# Patient Record
Sex: FEMALE | Race: WHITE | NOT HISPANIC OR LATINO | Employment: OTHER | URBAN - METROPOLITAN AREA
[De-identification: names, ages, dates, MRNs, and addresses within clinical notes are randomized per-mention and may not be internally consistent; named-entity substitution may affect disease eponyms.]

---

## 2022-07-14 ENCOUNTER — OFFICE VISIT (OUTPATIENT)
Dept: FAMILY MEDICINE CLINIC | Facility: CLINIC | Age: 77
End: 2022-07-14

## 2022-07-14 ENCOUNTER — LAB (OUTPATIENT)
Dept: LAB | Facility: HOSPITAL | Age: 77
End: 2022-07-14

## 2022-07-14 VITALS
HEIGHT: 61 IN | OXYGEN SATURATION: 97 % | RESPIRATION RATE: 18 BRPM | WEIGHT: 152 LBS | DIASTOLIC BLOOD PRESSURE: 70 MMHG | TEMPERATURE: 98.6 F | SYSTOLIC BLOOD PRESSURE: 114 MMHG | HEART RATE: 53 BPM | BODY MASS INDEX: 28.7 KG/M2

## 2022-07-14 DIAGNOSIS — D64.9 ANEMIA, UNSPECIFIED TYPE: ICD-10-CM

## 2022-07-14 DIAGNOSIS — I10 HYPERTENSION, UNSPECIFIED TYPE: ICD-10-CM

## 2022-07-14 DIAGNOSIS — Z12.31 ENCOUNTER FOR SCREENING MAMMOGRAM FOR MALIGNANT NEOPLASM OF BREAST: Primary | ICD-10-CM

## 2022-07-14 DIAGNOSIS — Z80.3 FAMILY HISTORY OF BREAST CANCER: ICD-10-CM

## 2022-07-14 DIAGNOSIS — R52 PAIN: ICD-10-CM

## 2022-07-14 DIAGNOSIS — R79.9 ABNORMAL FINDING OF BLOOD CHEMISTRY, UNSPECIFIED: ICD-10-CM

## 2022-07-14 LAB
25(OH)D3 SERPL-MCNC: 21.4 NG/ML (ref 30–100)
ALBUMIN SERPL-MCNC: 4.2 G/DL (ref 3.5–5.2)
ALBUMIN/GLOB SERPL: 1.9 G/DL
ALP SERPL-CCNC: 60 U/L (ref 39–117)
ALT SERPL W P-5'-P-CCNC: 10 U/L (ref 1–33)
ANION GAP SERPL CALCULATED.3IONS-SCNC: 11 MMOL/L (ref 5–15)
AST SERPL-CCNC: 18 U/L (ref 1–32)
BILIRUB SERPL-MCNC: 0.4 MG/DL (ref 0–1.2)
BUN SERPL-MCNC: 10 MG/DL (ref 8–23)
BUN/CREAT SERPL: 11.5 (ref 7–25)
CALCIUM SPEC-SCNC: 8.7 MG/DL (ref 8.6–10.5)
CHLORIDE SERPL-SCNC: 101 MMOL/L (ref 98–107)
CHOLEST SERPL-MCNC: 183 MG/DL (ref 0–200)
CHROMATIN AB SERPL-ACNC: <10 IU/ML (ref 0–14)
CO2 SERPL-SCNC: 26 MMOL/L (ref 22–29)
CREAT SERPL-MCNC: 0.87 MG/DL (ref 0.57–1)
CRP SERPL-MCNC: <0.3 MG/DL (ref 0–0.5)
DEPRECATED RDW RBC AUTO: 44.7 FL (ref 37–54)
EGFRCR SERPLBLD CKD-EPI 2021: 68.3 ML/MIN/1.73
ERYTHROCYTE [DISTWIDTH] IN BLOOD BY AUTOMATED COUNT: 15.1 % (ref 12.3–15.4)
ERYTHROCYTE [SEDIMENTATION RATE] IN BLOOD: 4 MM/HR (ref 0–30)
GLOBULIN UR ELPH-MCNC: 2.2 GM/DL
GLUCOSE SERPL-MCNC: 83 MG/DL (ref 65–99)
HBA1C MFR BLD: 5.5 % (ref 4.8–5.6)
HCT VFR BLD AUTO: 34.5 % (ref 34–46.6)
HDLC SERPL-MCNC: 84 MG/DL (ref 40–60)
HGB BLD-MCNC: 11.6 G/DL (ref 12–15.9)
LDLC SERPL CALC-MCNC: 86 MG/DL (ref 0–100)
LDLC/HDLC SERPL: 1.01 {RATIO}
MCH RBC QN AUTO: 28.2 PG (ref 26.6–33)
MCHC RBC AUTO-ENTMCNC: 33.6 G/DL (ref 31.5–35.7)
MCV RBC AUTO: 83.7 FL (ref 79–97)
PLATELET # BLD AUTO: 277 10*3/MM3 (ref 140–450)
PMV BLD AUTO: 9 FL (ref 6–12)
POTASSIUM SERPL-SCNC: 4.5 MMOL/L (ref 3.5–5.2)
PROT SERPL-MCNC: 6.4 G/DL (ref 6–8.5)
RBC # BLD AUTO: 4.12 10*6/MM3 (ref 3.77–5.28)
SODIUM SERPL-SCNC: 138 MMOL/L (ref 136–145)
TRIGL SERPL-MCNC: 69 MG/DL (ref 0–150)
TSH SERPL DL<=0.05 MIU/L-ACNC: 1.23 UIU/ML (ref 0.27–4.2)
VIT B12 BLD-MCNC: 170 PG/ML (ref 211–946)
VLDLC SERPL-MCNC: 13 MG/DL (ref 5–40)
WBC NRBC COR # BLD: 5.12 10*3/MM3 (ref 3.4–10.8)

## 2022-07-14 PROCEDURE — 80061 LIPID PANEL: CPT | Performed by: STUDENT IN AN ORGANIZED HEALTH CARE EDUCATION/TRAINING PROGRAM

## 2022-07-14 PROCEDURE — 85652 RBC SED RATE AUTOMATED: CPT | Performed by: STUDENT IN AN ORGANIZED HEALTH CARE EDUCATION/TRAINING PROGRAM

## 2022-07-14 PROCEDURE — 83036 HEMOGLOBIN GLYCOSYLATED A1C: CPT | Performed by: STUDENT IN AN ORGANIZED HEALTH CARE EDUCATION/TRAINING PROGRAM

## 2022-07-14 PROCEDURE — 36415 COLL VENOUS BLD VENIPUNCTURE: CPT | Performed by: STUDENT IN AN ORGANIZED HEALTH CARE EDUCATION/TRAINING PROGRAM

## 2022-07-14 PROCEDURE — 86140 C-REACTIVE PROTEIN: CPT | Performed by: STUDENT IN AN ORGANIZED HEALTH CARE EDUCATION/TRAINING PROGRAM

## 2022-07-14 PROCEDURE — 82728 ASSAY OF FERRITIN: CPT | Performed by: STUDENT IN AN ORGANIZED HEALTH CARE EDUCATION/TRAINING PROGRAM

## 2022-07-14 PROCEDURE — 82306 VITAMIN D 25 HYDROXY: CPT | Performed by: STUDENT IN AN ORGANIZED HEALTH CARE EDUCATION/TRAINING PROGRAM

## 2022-07-14 PROCEDURE — 82607 VITAMIN B-12: CPT | Performed by: STUDENT IN AN ORGANIZED HEALTH CARE EDUCATION/TRAINING PROGRAM

## 2022-07-14 PROCEDURE — 86431 RHEUMATOID FACTOR QUANT: CPT | Performed by: STUDENT IN AN ORGANIZED HEALTH CARE EDUCATION/TRAINING PROGRAM

## 2022-07-14 PROCEDURE — 86038 ANTINUCLEAR ANTIBODIES: CPT | Performed by: STUDENT IN AN ORGANIZED HEALTH CARE EDUCATION/TRAINING PROGRAM

## 2022-07-14 PROCEDURE — 85027 COMPLETE CBC AUTOMATED: CPT | Performed by: STUDENT IN AN ORGANIZED HEALTH CARE EDUCATION/TRAINING PROGRAM

## 2022-07-14 PROCEDURE — 84466 ASSAY OF TRANSFERRIN: CPT | Performed by: STUDENT IN AN ORGANIZED HEALTH CARE EDUCATION/TRAINING PROGRAM

## 2022-07-14 PROCEDURE — 86225 DNA ANTIBODY NATIVE: CPT | Performed by: STUDENT IN AN ORGANIZED HEALTH CARE EDUCATION/TRAINING PROGRAM

## 2022-07-14 PROCEDURE — 83540 ASSAY OF IRON: CPT | Performed by: STUDENT IN AN ORGANIZED HEALTH CARE EDUCATION/TRAINING PROGRAM

## 2022-07-14 PROCEDURE — 84443 ASSAY THYROID STIM HORMONE: CPT | Performed by: STUDENT IN AN ORGANIZED HEALTH CARE EDUCATION/TRAINING PROGRAM

## 2022-07-14 PROCEDURE — 99204 OFFICE O/P NEW MOD 45 MIN: CPT | Performed by: STUDENT IN AN ORGANIZED HEALTH CARE EDUCATION/TRAINING PROGRAM

## 2022-07-14 PROCEDURE — 80053 COMPREHEN METABOLIC PANEL: CPT | Performed by: STUDENT IN AN ORGANIZED HEALTH CARE EDUCATION/TRAINING PROGRAM

## 2022-07-14 PROCEDURE — 82746 ASSAY OF FOLIC ACID SERUM: CPT | Performed by: STUDENT IN AN ORGANIZED HEALTH CARE EDUCATION/TRAINING PROGRAM

## 2022-07-14 RX ORDER — METOPROLOL TARTRATE 50 MG/1
50 TABLET, FILM COATED ORAL 2 TIMES DAILY
COMMUNITY
Start: 2022-07-01 | End: 2022-12-06 | Stop reason: SDUPTHER

## 2022-07-14 RX ORDER — RIVAROXABAN 20 MG/1
20 TABLET, FILM COATED ORAL DAILY
COMMUNITY
Start: 2022-05-27 | End: 2022-10-28 | Stop reason: SDUPTHER

## 2022-07-14 RX ORDER — LORATADINE 10 MG/1
CAPSULE, LIQUID FILLED ORAL
COMMUNITY
End: 2022-12-06 | Stop reason: SDUPTHER

## 2022-07-14 RX ORDER — TRAZODONE HYDROCHLORIDE 100 MG/1
TABLET ORAL
COMMUNITY
Start: 2022-05-27 | End: 2022-07-14

## 2022-07-14 RX ORDER — GABAPENTIN 300 MG/1
300 CAPSULE ORAL 2 TIMES DAILY
COMMUNITY
Start: 2022-05-27 | End: 2022-08-17

## 2022-07-14 RX ORDER — LOSARTAN POTASSIUM 50 MG/1
TABLET ORAL
COMMUNITY
Start: 2022-05-03 | End: 2022-12-06 | Stop reason: SDUPTHER

## 2022-07-14 RX ORDER — AMIODARONE HYDROCHLORIDE 200 MG/1
200 TABLET ORAL DAILY
COMMUNITY
Start: 2022-05-27 | End: 2022-12-06 | Stop reason: SDUPTHER

## 2022-07-14 NOTE — PROGRESS NOTES
New Patient Office Visit      Patient Name: Catrachita Brunson  : 1945   MRN: [id number]     Chief Complaint:  Atrial Fibrillation (Est care)     History of Present Illness:     A fib  Diagnosed about 3 months ago in [place]. She was cardioverted. She takes anticoagulation with xarelto 20 mg daily. She sees cardiology in [place] but I do not have records. Requested. She is taking her metoprolol and amiodarone.  She is currently stable.     She has never been diagnosed with ra but has mcp swelling with chronic pain. She has morning stiffness.     htn  Taking losartan    Brother was diagnosed with stomach cancer. Mother had breast cancer.     Gabapentin  She says she takes this for chronic arthritis pain. She takes this very rarely . One pill every two weeks she says. She has >20 pills left. She says she actually doesn't want to be on this med if she doesn't have to be. Discussed I do not recommend to take it given her alcohol use it can cause serious issues.     She does have a history of alcohol use. She says she drinks 3 beers per day. She has been drinking her entire life she says. She has history of injury with fall while drunk she says and in the past she has been out all night drunk and not known what she had done.       Subjective        History reviewed. No pertinent past medical history.    Past Surgical History:   Procedure Laterality Date   • CARPAL TUNNEL RELEASE     • JOINT REPLACEMENT      both knees, both shoulders   • SPINE SURGERY      cervicla disk replacement       Family History   Problem Relation Age of Onset   • Cancer Mother    • Cancer Father    • Cancer Sister        Social History     Socioeconomic History   • Marital status: Single   Tobacco Use   • Smoking status: Never Smoker   • Smokeless tobacco: Never Used   Substance and Sexual Activity   • Alcohol use: Yes     Alcohol/week: 7.0 standard drinks     Types: 7 Cans of beer per week   • Drug use: Never   • Sexual activity:  "Defer          Current Outpatient Medications:   •  amiodarone (PACERONE) 200 MG tablet, Take 200 mg by mouth Daily., Disp: , Rfl:   •  Diclofenac Sodium (VOLTAREN) 1 % gel gel, APPLY 4 GRAMS TOPICALLY TO RIGHT KNEE 4 TIMES DAILY AS NEEDED FOR PAIN, Disp: , Rfl:   •  gabapentin (NEURONTIN) 300 MG capsule, Take 300 mg by mouth 2 (Two) Times a Day., Disp: , Rfl:   •  Loratadine 10 MG capsule, Take  by mouth., Disp: , Rfl:   •  losartan (COZAAR) 50 MG tablet, TAKE 1 TABLET BY MOUTH TWICE DAILY HOLD FOR SBP <110, Disp: , Rfl:   •  metoprolol tartrate (LOPRESSOR) 50 MG tablet, Take 50 mg by mouth 2 (Two) Times a Day., Disp: , Rfl:   •  Xarelto 20 MG tablet, Take 20 mg by mouth Daily., Disp: , Rfl:     Allergies   Allergen Reactions   • Penicillins Anaphylaxis       Objective     Physical Exam:  Vitals:    07/14/22 1350   BP: 114/70   Pulse: 53   Resp: 18   Temp: 98.6 °F (37 °C)   SpO2: 97%   Weight: 68.9 kg (152 lb)   Height: 154.9 cm (61\")      Body mass index is 28.72 kg/m².     Physical Exam  Constitutional:       General: She is not in acute distress.     Appearance: Normal appearance.   HENT:      Head: Normocephalic and atraumatic.   Eyes:      Extraocular Movements: Extraocular movements intact.   Cardiovascular:      Rate and Rhythm: Normal rate and regular rhythm.      Heart sounds: No murmur heard.  Pulmonary:      Effort: Pulmonary effort is normal. No respiratory distress.      Breath sounds: Normal breath sounds.   Abdominal:      General: Abdomen is flat.   Musculoskeletal:         General: No swelling.      Cervical back: Normal range of motion.   Skin:     Findings: No rash.   Neurological:      General: No focal deficit present.      Mental Status: She is alert.   Psychiatric:         Mood and Affect: Mood normal.              Assessment / Plan      Assessment/Plan:   Diagnoses and all orders for this visit:    1. Encounter for screening mammogram for malignant neoplasm of breast (Primary)  -     Mammo " screening digital tomosynthesis bilateral w CAD; Future    2. Family history of breast cancer  -     Ambulatory Referral to Genetics    3. Pain  -     Cancel: Ambulatory Referral to Pain Management  -     XR Hand 3+ View Bilateral (In Office)    4. Hypertension, unspecified type  -     CBC (No Diff); Future  -     Comprehensive Metabolic Panel; Future  -     Hemoglobin A1c; Future  -     TSH Rfx On Abnormal To Free T4; Future  -     Vitamin B12; Future  -     Vitamin D 25 Hydroxy; Future  -     Lipid Panel; Future  -     ANNABELLA; Future  -     Rheumatoid Factor; Future  -     Sedimentation rate, automated; Future  -     C-reactive protein; Future  -     Anti-DNA Antibody, Double-stranded; Future    5. Abnormal finding of blood chemistry, unspecified   -     Hemoglobin A1c; Future    6. Body mass index (BMI) 30.0-30.9, adult   -     Vitamin D 25 Hydroxy; Future    For arthritis,  obtain xray of hands as well as inflammatory work up.    Request cardio records.     I have counseled her on alcohol cessation. She declines referral to addiction medicine/aa resources or counseling.     Return in about 1 month (around 8/14/2022) for Medicare Wellness.       Maricel Cartagena D.O.  [facility]

## 2022-07-15 DIAGNOSIS — D64.9 ANEMIA, UNSPECIFIED TYPE: Primary | ICD-10-CM

## 2022-07-15 LAB
FERRITIN SERPL-MCNC: 37.3 NG/ML (ref 13–150)
FOLATE SERPL-MCNC: 14.5 NG/ML (ref 4.78–24.2)
IRON 24H UR-MRATE: 30 MCG/DL (ref 37–145)
IRON SATN MFR SERPL: 6 % (ref 20–50)
TIBC SERPL-MCNC: 483 MCG/DL (ref 298–536)
TRANSFERRIN SERPL-MCNC: 324 MG/DL (ref 200–360)

## 2022-07-18 LAB
ANA SER QL: NEGATIVE
DSDNA AB SER-ACNC: <1 IU/ML (ref 0–9)

## 2022-07-21 ENCOUNTER — TELEPHONE (OUTPATIENT)
Dept: GASTROENTEROLOGY | Facility: CLINIC | Age: 77
End: 2022-07-21

## 2022-08-01 ENCOUNTER — OFFICE VISIT (OUTPATIENT)
Dept: FAMILY MEDICINE CLINIC | Facility: CLINIC | Age: 77
End: 2022-08-01

## 2022-08-01 VITALS
HEART RATE: 54 BPM | BODY MASS INDEX: 27.94 KG/M2 | RESPIRATION RATE: 18 BRPM | TEMPERATURE: 98.2 F | SYSTOLIC BLOOD PRESSURE: 116 MMHG | DIASTOLIC BLOOD PRESSURE: 72 MMHG | HEIGHT: 61 IN | OXYGEN SATURATION: 97 % | WEIGHT: 148 LBS

## 2022-08-01 DIAGNOSIS — Z01.810 PREOP CARDIOVASCULAR EXAM: ICD-10-CM

## 2022-08-01 DIAGNOSIS — Z80.0 FAMILY HISTORY- STOMACH CANCER: ICD-10-CM

## 2022-08-01 DIAGNOSIS — D64.9 ANEMIA, UNSPECIFIED TYPE: Primary | ICD-10-CM

## 2022-08-01 PROCEDURE — 96372 THER/PROPH/DIAG INJ SC/IM: CPT | Performed by: STUDENT IN AN ORGANIZED HEALTH CARE EDUCATION/TRAINING PROGRAM

## 2022-08-01 PROCEDURE — 99214 OFFICE O/P EST MOD 30 MIN: CPT | Performed by: STUDENT IN AN ORGANIZED HEALTH CARE EDUCATION/TRAINING PROGRAM

## 2022-08-01 RX ORDER — CYANOCOBALAMIN 1000 UG/ML
1000 INJECTION, SOLUTION INTRAMUSCULAR; SUBCUTANEOUS
Status: SHIPPED | OUTPATIENT
Start: 2022-08-01

## 2022-08-01 RX ADMIN — CYANOCOBALAMIN 1000 MCG: 1000 INJECTION, SOLUTION INTRAMUSCULAR; SUBCUTANEOUS at 11:15

## 2022-08-01 NOTE — PROGRESS NOTES
"Chief Complaint  Pre-op Exam (Scheduled 9/22 with gastro)    History of Present Illness       She says she has never had a heart attack or stroke. She denies a history of heart failure. No history of prosthetic valve. No history of blood clot.  She has no chest pain shortness of breath or palpitations.   She says she can climb 2 flights of stairs without getting sob.    The following portions of the patient's history were reviewed and updated as appropriate: allergies, current medications, past family history, past medical history, past social history, past surgical history, and problem list.    OBJECTIVE:  /72   Pulse 54   Temp 98.2 °F (36.8 °C)   Resp 18   Ht 154.9 cm (61\")   Wt 67.1 kg (148 lb)   SpO2 97%   BMI 27.96 kg/m²       Physical Exam  Constitutional:       General: She is not in acute distress.     Appearance: Normal appearance.   HENT:      Head: Normocephalic and atraumatic.   Eyes:      Extraocular Movements: Extraocular movements intact.   Cardiovascular:      Rate and Rhythm: Normal rate and regular rhythm.      Heart sounds: No murmur heard.  Pulmonary:      Effort: Pulmonary effort is normal. No respiratory distress.      Breath sounds: Normal breath sounds.   Abdominal:      General: Abdomen is flat.   Musculoskeletal:         General: No swelling.      Cervical back: Normal range of motion.   Skin:     Findings: No rash.   Neurological:      General: No focal deficit present.      Mental Status: She is alert.   Psychiatric:         Mood and Affect: Mood normal.                    Assessment and Plan   Diagnoses and all orders for this visit:    1. Anemia, unspecified type (Primary)  -     Ambulatory referral for Screening EGD    2. Family history- stomach cancer  -     Ambulatory referral for Screening EGD    3. Preop cardiovascular exam          Chadsvasc is 4. She has been cardioverted and is now in sinus rhythm. Given this I have advised the patient to hold her blood thinner two " days before the procedure and resume one day after if no large polyp is removed. Resume two days later if a large polyp is removed (1 cm polyp or greater).   From a cardiovascular standpoint she is low risk for this procedure.     Will again request records from cardiology.    Return for Next scheduled follow up.       Maricel Cartagena D.O.  [facility]

## 2022-08-02 ENCOUNTER — TELEPHONE (OUTPATIENT)
Dept: FAMILY MEDICINE CLINIC | Facility: CLINIC | Age: 77
End: 2022-08-02

## 2022-08-02 DIAGNOSIS — M11.20 CALCIUM PYROPHOSPHATE DEPOSITION DISEASE (CPPD): Primary | ICD-10-CM

## 2022-08-02 NOTE — TELEPHONE ENCOUNTER
Spoke with the patient advised the pt that no other labs are needed for Dr. Cartagena. Pt verbalize understanding has no further questions at this time.

## 2022-08-02 NOTE — TELEPHONE ENCOUNTER
Asha called stating Ms. Brunson was at their hospital for labwork with no orders.  She said she is a patient of Dr. Cartagena's and did not come with any paperwork.  She stated she is there for pre-op bloodwork but they don't know what she would need and needed some orders.  I spoke with Oksana and Dr. Altamirano and neither could find anywhere in the chart saying bloodwork was needed.      I tried to call Asha back twice to let her know Dr. Cartagena was out of the office this afternoon and this could not be figured out today but it only rang numerous times with no answer.    Can someone call the patient and see what she thinks is needed? [phone]

## 2022-08-10 ENCOUNTER — APPOINTMENT (OUTPATIENT)
Dept: MAMMOGRAPHY | Facility: HOSPITAL | Age: 77
End: 2022-08-10

## 2022-08-11 ENCOUNTER — TELEPHONE (OUTPATIENT)
Dept: GASTROENTEROLOGY | Facility: CLINIC | Age: 77
End: 2022-08-11

## 2022-08-11 NOTE — TELEPHONE ENCOUNTER
I CALLED MS BRADLEY BACK. PATIENT STATED SHE HAD ALREADY TALKED TO SOMEONE AND GO IT TAKEN CARE OF.

## 2022-08-31 ENCOUNTER — TELEPHONE (OUTPATIENT)
Dept: FAMILY MEDICINE CLINIC | Facility: CLINIC | Age: 77
End: 2022-08-31

## 2022-09-09 ENCOUNTER — TELEPHONE (OUTPATIENT)
Dept: FAMILY MEDICINE CLINIC | Facility: CLINIC | Age: 77
End: 2022-09-09

## 2022-09-22 NOTE — TELEPHONE ENCOUNTER
Patient called back she see Francisco Ibrahim at [facility]. Faxed over clearance

## 2022-09-22 NOTE — TELEPHONE ENCOUNTER
Spoke with patient she states shes unsure who her cardiologist is. Will call daughter and call back who she sees.

## 2022-09-26 NOTE — TELEPHONE ENCOUNTER
Patients cardiologist wont clear patient due to not keeping her 6 month follow up with him. She has a EGD scheduled tomorrow. Would you like to proceed with this ?

## 2022-09-26 NOTE — TELEPHONE ENCOUNTER
Shes having the EGD done due to anemia. I did request clearance From Dr. Francisco Ibrahim her cardiologist and she has canceled in the past due to no .

## 2022-09-27 ENCOUNTER — OUTSIDE FACILITY SERVICE (OUTPATIENT)
Dept: GASTROENTEROLOGY | Facility: CLINIC | Age: 77
End: 2022-09-27

## 2022-09-27 PROCEDURE — 43239 EGD BIOPSY SINGLE/MULTIPLE: CPT | Performed by: INTERNAL MEDICINE

## 2022-09-27 PROCEDURE — 88305 TISSUE EXAM BY PATHOLOGIST: CPT | Performed by: INTERNAL MEDICINE

## 2022-09-28 ENCOUNTER — LAB REQUISITION (OUTPATIENT)
Dept: LAB | Facility: HOSPITAL | Age: 77
End: 2022-09-28

## 2022-09-28 DIAGNOSIS — D64.9 ANEMIA, UNSPECIFIED: ICD-10-CM

## 2022-09-29 LAB
CYTO UR: NORMAL
LAB AP CASE REPORT: NORMAL
LAB AP CLINICAL INFORMATION: NORMAL
PATH REPORT.FINAL DX SPEC: NORMAL
PATH REPORT.GROSS SPEC: NORMAL

## 2022-09-30 ENCOUNTER — TELEPHONE (OUTPATIENT)
Dept: GASTROENTEROLOGY | Facility: CLINIC | Age: 77
End: 2022-09-30

## 2022-09-30 NOTE — TELEPHONE ENCOUNTER
----- Message from Sy Red MD sent at 9/29/2022  4:12 PM EDT -----  Please let Catrachita know that she does not have H. pylori

## 2022-10-05 ENCOUNTER — TELEPHONE (OUTPATIENT)
Dept: GASTROENTEROLOGY | Facility: CLINIC | Age: 77
End: 2022-10-05

## 2022-10-11 ENCOUNTER — TELEPHONE (OUTPATIENT)
Dept: GASTROENTEROLOGY | Facility: CLINIC | Age: 77
End: 2022-10-11

## 2022-10-11 DIAGNOSIS — Z12.11 ENCOUNTER FOR SCREENING COLONOSCOPY: Primary | ICD-10-CM

## 2022-10-18 ENCOUNTER — OUTSIDE FACILITY SERVICE (OUTPATIENT)
Dept: GASTROENTEROLOGY | Facility: CLINIC | Age: 77
End: 2022-10-18

## 2022-10-18 PROCEDURE — 45385 COLONOSCOPY W/LESION REMOVAL: CPT | Performed by: INTERNAL MEDICINE

## 2022-10-18 PROCEDURE — 88305 TISSUE EXAM BY PATHOLOGIST: CPT | Performed by: INTERNAL MEDICINE

## 2022-10-19 ENCOUNTER — LAB REQUISITION (OUTPATIENT)
Dept: LAB | Facility: HOSPITAL | Age: 77
End: 2022-10-19

## 2022-10-19 DIAGNOSIS — D64.9 ANEMIA, UNSPECIFIED: ICD-10-CM

## 2022-10-20 ENCOUNTER — TELEPHONE (OUTPATIENT)
Dept: FAMILY MEDICINE CLINIC | Facility: CLINIC | Age: 77
End: 2022-10-20

## 2022-10-21 ENCOUNTER — TELEPHONE (OUTPATIENT)
Dept: GASTROENTEROLOGY | Facility: CLINIC | Age: 77
End: 2022-10-21

## 2022-10-21 NOTE — TELEPHONE ENCOUNTER
----- Message from Sy Red MD sent at 10/20/2022  2:34 PM EDT -----  Please let Catrachita know that she had multiple adenomas.  Follow-up in 2 years time is recommended

## 2022-10-28 ENCOUNTER — OFFICE VISIT (OUTPATIENT)
Dept: FAMILY MEDICINE CLINIC | Facility: CLINIC | Age: 77
End: 2022-10-28

## 2022-10-28 VITALS
DIASTOLIC BLOOD PRESSURE: 82 MMHG | WEIGHT: 142.2 LBS | RESPIRATION RATE: 15 BRPM | TEMPERATURE: 97.8 F | HEIGHT: 61 IN | HEART RATE: 52 BPM | OXYGEN SATURATION: 96 % | SYSTOLIC BLOOD PRESSURE: 117 MMHG | BODY MASS INDEX: 26.85 KG/M2

## 2022-10-28 DIAGNOSIS — Z00.00 WELLNESS EXAMINATION: ICD-10-CM

## 2022-10-28 DIAGNOSIS — M11.20 CALCIUM PYROPHOSPHATE DEPOSITION DISEASE (CPPD): ICD-10-CM

## 2022-10-28 DIAGNOSIS — Z12.31 ENCOUNTER FOR SCREENING MAMMOGRAM FOR MALIGNANT NEOPLASM OF BREAST: Primary | ICD-10-CM

## 2022-10-28 DIAGNOSIS — Z78.0 ENCOUNTER FOR OSTEOPOROSIS SCREENING IN ASYMPTOMATIC POSTMENOPAUSAL PATIENT: ICD-10-CM

## 2022-10-28 DIAGNOSIS — Z13.820 ENCOUNTER FOR OSTEOPOROSIS SCREENING IN ASYMPTOMATIC POSTMENOPAUSAL PATIENT: ICD-10-CM

## 2022-10-28 DIAGNOSIS — I48.19 OTHER PERSISTENT ATRIAL FIBRILLATION: ICD-10-CM

## 2022-10-28 DIAGNOSIS — M79.671 RIGHT FOOT PAIN: ICD-10-CM

## 2022-10-28 PROCEDURE — 1159F MED LIST DOCD IN RCRD: CPT | Performed by: STUDENT IN AN ORGANIZED HEALTH CARE EDUCATION/TRAINING PROGRAM

## 2022-10-28 PROCEDURE — 1160F RVW MEDS BY RX/DR IN RCRD: CPT | Performed by: STUDENT IN AN ORGANIZED HEALTH CARE EDUCATION/TRAINING PROGRAM

## 2022-10-28 PROCEDURE — 1126F AMNT PAIN NOTED NONE PRSNT: CPT | Performed by: STUDENT IN AN ORGANIZED HEALTH CARE EDUCATION/TRAINING PROGRAM

## 2022-10-28 PROCEDURE — 1170F FXNL STATUS ASSESSED: CPT | Performed by: STUDENT IN AN ORGANIZED HEALTH CARE EDUCATION/TRAINING PROGRAM

## 2022-10-28 PROCEDURE — G0439 PPPS, SUBSEQ VISIT: HCPCS | Performed by: STUDENT IN AN ORGANIZED HEALTH CARE EDUCATION/TRAINING PROGRAM

## 2022-10-28 RX ORDER — TRAZODONE HYDROCHLORIDE 100 MG/1
TABLET ORAL
COMMUNITY
Start: 2022-08-23 | End: 2022-12-06 | Stop reason: SDUPTHER

## 2022-10-28 RX ORDER — RIVAROXABAN 20 MG/1
20 TABLET, FILM COATED ORAL DAILY
Qty: 90 TABLET | Refills: 1 | Status: SHIPPED | OUTPATIENT
Start: 2022-10-28 | End: 2022-12-06 | Stop reason: SDUPTHER

## 2022-10-28 NOTE — PROGRESS NOTES
The ABCs of the Annual Wellness Visit  Subsequent Medicare Wellness Visit    Chief Complaint   Patient presents with   • Medicare Wellness-subsequent     Right foot pain      Subjective    History of Present Illness:  Catrachita Brunson is a 77 y.o. female who presents for a Subsequent Medicare Wellness Visit.    The following portions of the patient's history were reviewed and   updated as appropriate: allergies, current medications, past family history, past medical history, past social history, past surgical history and problem list.    Compared to one year ago, the patient feels her physical   health is the same.    Compared to one year ago, the patient feels her mental   health is the same.    Recent Hospitalizations:  She was not admitted to the hospital during the last year.       Current Medical Providers:  Patient Care Team:  Maricel Cartagena DO as PCP - General (Family Medicine)  Francisco Ibrahim MD as Consulting Physician (Interventional Cardiology)    Outpatient Medications Prior to Visit   Medication Sig Dispense Refill   • amiodarone (PACERONE) 200 MG tablet Take 200 mg by mouth Daily.     • Diclofenac Sodium (VOLTAREN) 1 % gel gel APPLY 4 GRAMS TOPICALLY TO RIGHT KNEE 4 TIMES DAILY AS NEEDED FOR PAIN     • Loratadine 10 MG capsule Take  by mouth.     • losartan (COZAAR) 50 MG tablet TAKE 1 TABLET BY MOUTH TWICE DAILY HOLD FOR SBP <110     • metoprolol tartrate (LOPRESSOR) 50 MG tablet Take 50 mg by mouth 2 (Two) Times a Day.     • Sod Picosulfate-Mag Ox-Cit Acd 10-3.5-12 MG-GM -GM/160ML solution Take 160 mL by mouth Take As Directed for 2 doses. 320 mL 0   • traZODone (DESYREL) 100 MG tablet TAKE 1 TABLET BY MOUTH ONCE DAILY AT NIGHT AS NEEDED     • Xarelto 20 MG tablet Take 20 mg by mouth Daily.       Facility-Administered Medications Prior to Visit   Medication Dose Route Frequency Provider Last Rate Last Admin   • cyanocobalamin injection 1,000 mcg  1,000 mcg Intramuscular Q28 Days Maricel Cartagena DO    "1,000 mcg at 08/01/22 1115       No opioid medication identified on active medication list. I have reviewed chart for other potential  high risk medication/s and harmful drug interactions in the elderly.              Patient Active Problem List   Diagnosis   • Preop cardiovascular exam   • Wellness examination     Advance Care Planning  Advance Directive is not on file.  ACP discussion was held with the patient during this visit. Patient does not have an advance directive, information provided.          Objective    Vitals:    10/28/22 1018   BP: 117/82   Pulse: 52   Resp: 15   Temp: 97.8 °F (36.6 °C)   SpO2: 96%   Weight: 64.5 kg (142 lb 3.2 oz)   Height: 154.9 cm (61\")   PainSc: 0-No pain     Estimated body mass index is 26.87 kg/m² as calculated from the following:    Height as of this encounter: 154.9 cm (61\").    Weight as of this encounter: 64.5 kg (142 lb 3.2 oz).      Does the patient have evidence of cognitive impairment? Yes    Physical Exam  Constitutional:       General: She is not in acute distress.     Appearance: Normal appearance.   HENT:      Head: Normocephalic and atraumatic.   Eyes:      Extraocular Movements: Extraocular movements intact.   Cardiovascular:      Rate and Rhythm: Normal rate and regular rhythm.      Heart sounds: No murmur heard.  Pulmonary:      Effort: Pulmonary effort is normal. No respiratory distress.      Breath sounds: Normal breath sounds. No stridor. No wheezing, rhonchi or rales.   Skin:     Findings: No rash.   Neurological:      General: No focal deficit present.      Mental Status: She is alert.   Psychiatric:         Mood and Affect: Mood normal.                 HEALTH RISK ASSESSMENT    Smoking Status:  Social History     Tobacco Use   Smoking Status Never   Smokeless Tobacco Never     Alcohol Consumption:  Social History     Substance and Sexual Activity   Alcohol Use Yes   • Alcohol/week: 7.0 standard drinks   • Types: 7 Cans of beer per week     Fall Risk " Screen:    DAVIDSON Fall Risk Assessment was completed, and patient is at HIGH risk for falls. Assessment completed on:10/28/2022    Depression Screening:  PHQ-2/PHQ-9 Depression Screening 10/28/2022   Little Interest or Pleasure in Doing Things 1-->several days   Feeling Down, Depressed or Hopeless 1-->several days   Trouble Falling or Staying Asleep, or Sleeping Too Much 0-->not at all   Feeling Tired or Having Little Energy 0-->not at all   Poor Appetite or Overeating 0-->not at all   Feeling Bad about Yourself - or that You are a Failure or Have Let Yourself or Your Family Down 0-->not at all   Trouble Concentrating on Things, Such as Reading the Newspaper or Watching Television 0-->not at all   Moving or Speaking So Slowly that Other People Could Have Noticed? Or the Opposite - Being So Fidgety 0-->not at all   Thoughts that You Would be Better Off Dead or of Hurting Yourself in Some Way 0-->not at all   PHQ-9: Brief Depression Severity Measure Score 2   If You Checked Off Any Problems, How Difficult Have These Problems Made It For You to Do Your Work, Take Care of Things at Home, or Get Along with Other People? somewhat difficult       Health Habits and Functional and Cognitive Screening:  Functional & Cognitive Status 10/28/2022   Do you have difficulty preparing food and eating? No   Do you have difficulty bathing yourself, getting dressed or grooming yourself? No   Do you have difficulty using the toilet? No   Do you have difficulty moving around from place to place? No   Do you have trouble with steps or getting out of a bed or a chair? Yes   Current Diet Unhealthy Diet   Dental Exam Not up to date   Eye Exam Up to date   Exercise (times per week) 0 times per week   Do you need help using the phone?  No   Are you deaf or do you have serious difficulty hearing?  No   Do you need help with transportation? No   Do you need help shopping? No   Do you need help preparing meals?  No   Do you need help with  housework?  No   Do you need help with laundry? No   Do you need help taking your medications? No   Do you need help managing money? No   Do you ever drive or ride in a car without wearing a seat belt? No       Age-appropriate Screening Schedule:  Refer to the list below for future screening recommendations based on patient's age, sex and/or medical conditions. Orders for these recommended tests are listed in the plan section. The patient has been provided with a written plan.    Health Maintenance   Topic Date Due   • DXA SCAN  Never done   • TDAP/TD VACCINES (1 - Tdap) Never done   • ZOSTER VACCINE (1 of 2) Never done   • INFLUENZA VACCINE  Never done              Assessment & Plan   CMS Preventative Services Quick Reference  Risk Factors Identified During Encounter  Alcohol Misuse  The above risks/problems have been discussed with the patient.  Follow up actions/plans if indicated are seen below in the Assessment/Plan Section.  Pertinent information has been shared with the patient in the After Visit Summary.    Diagnoses and all orders for this visit:    1. Encounter for screening mammogram for malignant neoplasm of breast (Primary)  -     Mammo screening digital tomosynthesis bilateral w CAD; Future    2. Encounter for osteoporosis screening in asymptomatic postmenopausal patient  -     DEXA Bone Density Axial; Future    3. Wellness examination  -     HIV-1 / O / 2 Ag / Antibody 4th Generation; Future  -     Hepatitis C antibody; Future    4. Right foot pain  -     XR Foot 3+ View Right (In Office)    5. Calcium pyrophosphate deposition disease (CPPD)  -     Cancel: Ambulatory Referral to Rheumatology  -     Ambulatory Referral to Rheumatology    6. Other persistent atrial fibrillation (HCC)  -     Ambulatory Referral to Cardiology    Other orders  -     Xarelto 20 MG tablet; Take 1 tablet by mouth Daily.  Dispense: 90 tablet; Refill: 1        Follow Up:   Return in about 3 weeks (around 11/18/2022), or if  symptoms worsen or fail to improve.     An After Visit Summary and PPPS were made available to the patient.          Annual exam  Colonoscopy up to date   Mammogram says she missed this and needs new order.   Pap smear aged out. She is s/p julisa  dexa scan ordered.   hiv hep c  Screening she is agreeable  a1c /lipid screening up to date   She declines all vaccines today  Counseled on alcohol cessation.     Patient says she has been seeing cardiology at [facility] but I do not have record of this. Will refer to cardiology for a fib.     She complains of pain in her right foot on the bottom of her foot for 3 weeks . No fever or injury but she is concerned she has possibly stepped on glass but has not seen any. On exam there is is a small cut about 1 cm that is in process of healing with no glass or foreign body. Just above this cut there is a firmness under the skin with no skin rash or opening, no foreign body in the skin here is seen. Given there is a firmness of this skin I am concerned she could have glass that has healed over or a thickened callus. I have given her the number to podiatry and scheduled the appointment for her for next Thursday at 10 am. Advised her to contact her transport service today and let them know .given her podiatry address date and time written down. Will check xray while here. Her gait is normal. No fevers. Advised her to go to podiatry today but she is unable today.

## 2022-12-06 ENCOUNTER — TELEPHONE (OUTPATIENT)
Dept: FAMILY MEDICINE CLINIC | Facility: CLINIC | Age: 77
End: 2022-12-06

## 2022-12-06 DIAGNOSIS — Z12.11 ENCOUNTER FOR SCREENING COLONOSCOPY: ICD-10-CM

## 2022-12-06 RX ORDER — METOPROLOL TARTRATE 50 MG/1
50 TABLET, FILM COATED ORAL 2 TIMES DAILY
Qty: 60 TABLET | Refills: 0 | Status: SHIPPED | OUTPATIENT
Start: 2022-12-06 | End: 2023-01-03

## 2022-12-06 RX ORDER — LOSARTAN POTASSIUM 50 MG/1
TABLET ORAL
Qty: 60 TABLET | Refills: 0 | Status: SHIPPED | OUTPATIENT
Start: 2022-12-06 | End: 2023-01-03

## 2022-12-06 RX ORDER — AMIODARONE HYDROCHLORIDE 200 MG/1
200 TABLET ORAL DAILY
Qty: 30 TABLET | Refills: 0 | Status: SHIPPED | OUTPATIENT
Start: 2022-12-06 | End: 2023-01-03

## 2022-12-06 RX ORDER — LORATADINE 10 MG/1
10 CAPSULE, LIQUID FILLED ORAL DAILY
Qty: 30 EACH | Refills: 0 | Status: SHIPPED | OUTPATIENT
Start: 2022-12-06

## 2022-12-06 RX ORDER — TRAZODONE HYDROCHLORIDE 100 MG/1
100 TABLET ORAL NIGHTLY
Qty: 30 TABLET | Refills: 0 | Status: SHIPPED | OUTPATIENT
Start: 2022-12-06 | End: 2023-01-03

## 2022-12-06 RX ORDER — RIVAROXABAN 20 MG/1
20 TABLET, FILM COATED ORAL DAILY
Qty: 90 TABLET | Refills: 0 | Status: SHIPPED | OUTPATIENT
Start: 2022-12-06

## 2022-12-06 NOTE — TELEPHONE ENCOUNTER
Caller: Catrachita Brunson    Relationship: Self    Best call back number: [phone]    Requested Prescriptions:   Requested Prescriptions     Pending Prescriptions Disp Refills   • amiodarone (PACERONE) 200 MG tablet       Sig: Take 1 tablet by mouth Daily.   • Diclofenac Sodium (VOLTAREN) 1 % gel gel     • Loratadine 10 MG capsule 30 each      Sig: Take  by mouth.   • losartan (COZAAR) 50 MG tablet     • metoprolol tartrate (LOPRESSOR) 50 MG tablet       Sig: Take 1 tablet by mouth 2 (Two) Times a Day.   • Sod Picosulfate-Mag Ox-Cit Acd 10-3.5-12 MG-GM -GM/160ML solution 320 mL 0     Sig: Take 160 mL by mouth Take As Directed for 2 doses.   • traZODone (DESYREL) 100 MG tablet     • Xarelto 20 MG tablet 90 tablet 1     Sig: Take 1 tablet by mouth Daily.      Pharmacy where request should be sent: [facility] [address] [place] [id number] [id number] FX     Does the patient have less than a 3 day supply:  [x] Yes  [] No    Would you like a call back once the refill request has been completed: [x] Yes [] No    If the office needs to give you a call back, can they leave a voicemail: [x] Yes [] No    Janel Mercado Rep   12/06/22 11:42 EST

## 2022-12-08 ENCOUNTER — OFFICE VISIT (OUTPATIENT)
Dept: FAMILY MEDICINE CLINIC | Facility: CLINIC | Age: 77
End: 2022-12-08

## 2022-12-08 ENCOUNTER — LAB (OUTPATIENT)
Dept: LAB | Facility: HOSPITAL | Age: 77
End: 2022-12-08

## 2022-12-08 VITALS
BODY MASS INDEX: 26.83 KG/M2 | OXYGEN SATURATION: 96 % | SYSTOLIC BLOOD PRESSURE: 167 MMHG | TEMPERATURE: 98 F | WEIGHT: 142 LBS | HEART RATE: 76 BPM | DIASTOLIC BLOOD PRESSURE: 92 MMHG | RESPIRATION RATE: 19 BRPM
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
## 2022-12-08 DIAGNOSIS — Z00.00 WELLNESS EXAMINATION: ICD-10-CM

## 2022-12-08 DIAGNOSIS — E53.8 B12 DEFICIENCY: ICD-10-CM

## 2022-12-08 DIAGNOSIS — Z11.59 ENCOUNTER FOR SCREENING FOR OTHER VIRAL DISEASES: ICD-10-CM

## 2022-12-08 DIAGNOSIS — D64.9 ANEMIA, UNSPECIFIED TYPE: Primary | ICD-10-CM

## 2022-12-08 DIAGNOSIS — D64.9 ANEMIA, UNSPECIFIED TYPE: ICD-10-CM

## 2022-12-08 PROBLEM — I10 PRIMARY HYPERTENSION: Status: ACTIVE | Noted: 2022-12-08

## 2022-12-08 LAB
DEPRECATED RDW RBC AUTO: 54.2 FL (ref 37–54)
ERYTHROCYTE [DISTWIDTH] IN BLOOD BY AUTOMATED COUNT: 17.4 % (ref 12.3–15.4)
HCT VFR BLD AUTO: 37.5 % (ref 34–46.6)
HCV AB SER DONR QL: NORMAL
HGB BLD-MCNC: 11.9 G/DL (ref 12–15.9)
HIV1+2 AB SER QL: NORMAL
MCH RBC QN AUTO: 26.9 PG (ref 26.6–33)
MCHC RBC AUTO-ENTMCNC: 31.7 G/DL (ref 31.5–35.7)
MCV RBC AUTO: 84.8 FL (ref 79–97)
PLATELET # BLD AUTO: 295 10*3/MM3 (ref 140–450)
PMV BLD AUTO: 9 FL (ref 6–12)
RBC # BLD AUTO: 4.42 10*6/MM3 (ref 3.77–5.28)
WBC NRBC COR # BLD: 5.03 10*3/MM3 (ref 3.4–10.8)

## 2022-12-08 PROCEDURE — 90746 HEPB VACCINE 3 DOSE ADULT IM: CPT | Performed by: STUDENT IN AN ORGANIZED HEALTH CARE EDUCATION/TRAINING PROGRAM

## 2022-12-08 PROCEDURE — G0432 EIA HIV-1/HIV-2 SCREEN: HCPCS

## 2022-12-08 PROCEDURE — 36415 COLL VENOUS BLD VENIPUNCTURE: CPT

## 2022-12-08 PROCEDURE — 85027 COMPLETE CBC AUTOMATED: CPT

## 2022-12-08 PROCEDURE — 99214 OFFICE O/P EST MOD 30 MIN: CPT | Performed by: STUDENT IN AN ORGANIZED HEALTH CARE EDUCATION/TRAINING PROGRAM

## 2022-12-08 PROCEDURE — G0499 HEPB SCREEN HIGH RISK INDIV: HCPCS

## 2022-12-08 PROCEDURE — 86803 HEPATITIS C AB TEST: CPT

## 2022-12-08 PROCEDURE — 86592 SYPHILIS TEST NON-TREP QUAL: CPT

## 2022-12-08 PROCEDURE — 86340 INTRINSIC FACTOR ANTIBODY: CPT

## 2022-12-08 PROCEDURE — G0010 ADMIN HEPATITIS B VACCINE: HCPCS | Performed by: STUDENT IN AN ORGANIZED HEALTH CARE EDUCATION/TRAINING PROGRAM

## 2022-12-08 RX ORDER — LANOLIN ALCOHOL/MO/W.PET/CERES
1000 CREAM (GRAM) TOPICAL DAILY
Qty: 90 TABLET | Refills: 3 | Status: SHIPPED | OUTPATIENT
Start: 2022-12-08

## 2022-12-08 NOTE — PROGRESS NOTES
Chief Complaint  Anemia    History of Present Illness     Foot pain  She says she was found to have a needle deep in her foot. This was removed by podiatry. She has apt for suture removal on the 12th.     The following portions of the patient's history were reviewed and updated as appropriate: allergies, current medications, past family history, past medical history, past social history, past surgical history, and problem list.    OBJECTIVE:  /92   Pulse 76   Temp 98 °F (36.7 °C)   Resp 19   Wt 64.4 kg (142 lb)   SpO2 96%   BMI 26.83 kg/m²       Physical Exam  Constitutional:       General: She is not in acute distress.     Appearance: Normal appearance.   HENT:      Head: Normocephalic and atraumatic.   Eyes:      Extraocular Movements: Extraocular movements intact.   Cardiovascular:      Rate and Rhythm: Normal rate and regular rhythm.      Heart sounds: No murmur heard.  Pulmonary:      Effort: Pulmonary effort is normal. No respiratory distress.      Breath sounds: Normal breath sounds. No stridor. No wheezing, rhonchi or rales.   Skin:     Findings: No rash.   Neurological:      General: No focal deficit present.      Mental Status: She is alert.   Psychiatric:         Mood and Affect: Mood normal.                    Assessment and Plan   Diagnoses and all orders for this visit:    1. Anemia, unspecified type (Primary)  -     CBC (No Diff); Future    2. Exposure to needle, sequela  -     Hepatitis C Antibody; Future  -     HIV-1 / O / 2 Ag / Antibody 4th Generation; Future  -     Hepatitis B Virus (HBV) Screening and Diagnosis; Future  -     RPR; Future    3. Encounter for screening for other viral diseases  -     Hepatitis B Virus (HBV) Screening and Diagnosis; Future    4. B12 deficiency  -     Intrinsic Factor Ab; Future    Other orders  -     vitamin B-12 (CYANOCOBALAMIN) 1000 MCG tablet; Take 1 tablet by mouth Daily.  Dispense: 90 tablet; Refill: 3  -     Hepatitis B Vaccine Adult IM  (ENERGIX/RECOMBIVAX)      She is out of window for hiv post exposure prophylaxis. Will give hep b vaccine and check hep b /hiv/hep c/rpr and recheck in 4 weeks. She doesn't have symptoms at this time. I do not have the hep b ivig at this clinic, but I have given her the number for the health dept to call and schedule if she is interested in hep b post exposure prophylaxis given needle stick in her foot.     Bp elevated today but she says she did not take her beta blocker. She says she has bp cuff at home. Will have her monitor and if over 140 systolic at home consistently will add another bp medication. She will let me know.    Return in about 1 month (around 1/8/2023).       Maricel Cartagena D.O.  [facility]

## 2022-12-09 LAB — RPR SER QL: NORMAL

## 2022-12-10 LAB
HBV CORE AB SERPL QL IA: NEGATIVE
HBV SURFACE AB SER QL: NON REACTIVE
HBV SURFACE AG SERPL QL IA: NEGATIVE
IMP & REVIEW OF LAB RESULTS: NORMAL
LABORATORY COMMENT REPORT: NORMAL

## 2022-12-11 LAB — IF BLOCK AB SER-ACNC: 1 AU/ML (ref 0–1.1)

## 2022-12-29 ENCOUNTER — APPOINTMENT (OUTPATIENT)
Dept: MAMMOGRAPHY | Facility: HOSPITAL | Age: 77
End: 2022-12-29

## 2022-12-29 ENCOUNTER — APPOINTMENT (OUTPATIENT)
Dept: BONE DENSITY | Facility: HOSPITAL | Age: 77
End: 2022-12-29

## 2022-12-29 NOTE — PROGRESS NOTES
[facility] Cardiology  Consultation H&P  Catrachita Brunson  1945  [address] [facility] [address]     VISIT DATE:  01/05/23    PCP: Maricel Cartagena DO  [address] [address]    IDENTIFICATION: A 77 y.o. female  resident of [place], [place]  former Dr. Ibrahim patient,     PROBLEM LIST:  1. Atrial fibrillation  a. 9/30/2021 BULL/ECV: LVEF 40 +/- 5%, mod LVH, calcified aortic leaflets with mod AR, mildly dilated aortic root, no RYAN thrombus, successful cardioversion with restoration of sinus rhythm  b. [id number] = 5 (age x2, gender, hypertension, HF hx)  c. On amiodarone, metoprolol and Xarelto  d. TSH normal 7/22  2. Systolic Heart Failure,unknown chronicity  a. BULL 9/2021: LVEF 40 +/- 5%  3. Hypertension  4. Anemia, likely iron-deficient  a. 7/2022: Iron 30 (L), iron saturation 6% (L), transferrin and TIBC normal  5. EtOH abuse  6. Lipid Status  a. 7/2022: , trig 69, HDL 84, LDL 86  7. Surgical history  a. Carpal tunnel release  b. Bilateral total knee and bilateral total shoulder arthroplasties  c. Cervical disc replacement        CC:  Chief Complaint   Patient presents with   • Atrial Fibrillation       Allergies  Allergies   Allergen Reactions   • Penicillins Anaphylaxis       Current Medications    Current Outpatient Medications:   •  Diclofenac Sodium (VOLTAREN) 1 % gel gel, Apply 4 grams topically to right knee QID prn pain, Disp: 100 g, Rfl: 0  •  Loratadine 10 MG capsule, Take 1 capsule by mouth Daily., Disp: 30 each, Rfl: 0  •  losartan (COZAAR) 50 MG tablet, TAKE 1 TABLET BY MOUTH TWICE DAILY HOLD  FOR  SBP<110, Disp: 60 tablet, Rfl: 0  •  metoprolol tartrate (LOPRESSOR) 50 MG tablet, Take 1 tablet by mouth twice daily, Disp: 60 tablet, Rfl: 0  •  traZODone (DESYREL) 100 MG tablet, TAKE 1 TABLET BY MOUTH ONCE DAILY AT NIGHT, Disp: 30 tablet, Rfl: 0  •  Xarelto 20 MG tablet, Take 1 tablet by mouth Daily., Disp: 90 tablet, Rfl: 0  •  vitamin B-12  (CYANOCOBALAMIN) 1000 MCG tablet, Take 1 tablet by mouth Daily., Disp: 90 tablet, Rfl: 3    Current Facility-Administered Medications:   •  cyanocobalamin injection 1,000 mcg, 1,000 mcg, Intramuscular, Q28 Days, Maricel Cartagena DO, 1,000 mcg at 08/01/22 1115     History of Present Illness   HPI  Catrachita Brunson is a 77 y.o. year old female with the above mentioned PMH who presents for consult from Maricel Cartagena DO for atrial fibrillation management.  She previously saw Dr. Ibrahim with [facility].  She admits to drinking 8-9 beers a night.  She has had atrial fibrillation since September 2021.  She underwent BULL/ECV with successful conversion to sinus rhythm.  She is then sinus bradycardia today.     Pt denies any chest pain, dyspnea at rest, dyspnea on exertion, orthopnea, PND, lower extremity edema, or claudication. Pt denies history of DVT, PE, MI, CVA, TIA, or rheumatic fever.       ROS  Review of Systems   Constitutional: Positive for malaise/fatigue.   HENT: Positive for congestion.    Musculoskeletal: Positive for joint pain.   Neurological: Positive for loss of balance.   All other systems reviewed and are negative.      SOCIAL HX  Social History     Socioeconomic History   • Marital status: Single   Tobacco Use   • Smoking status: Never   • Smokeless tobacco: Never   Vaping Use   • Vaping Use: Never used   Substance and Sexual Activity   • Alcohol use: Yes     Alcohol/week: 7.0 standard drinks     Types: 7 Cans of beer per week   • Drug use: Never   • Sexual activity: Defer       FAMILY HX  Family History   Problem Relation Age of Onset   • Cancer Mother    • Cancer Father    • Cancer Sister        Vitals:    01/05/23 1048   BP: 138/84   BP Location: Right arm   Patient Position: Sitting   Pulse: 50   SpO2: 98%   Weight: 65.3 kg (144 lb)   Height: 154.9 cm (61\")     Body mass index is 27.21 kg/m².     PHYSICAL EXAMINATION:  Constitutional:       Appearance: Healthy appearance. Not in distress.   Pulmonary:       Effort: Pulmonary effort is normal.      Breath sounds: No wheezing. No rhonchi. No rales.   Chest:      Chest wall: Not tender to palpatation.   Cardiovascular:      PMI at left midclavicular line. Bradycardia present. Regular rhythm. Normal S1. Normal S2.      Murmurs: There is no murmur.      No gallop. No click. No rub.   Pulses:     Intact distal pulses.   Edema:     Peripheral edema absent.   Musculoskeletal: Normal range of motion. Skin:     General: Skin is warm and dry.   Neurological:      General: No focal deficit present.      Mental Status: Alert and oriented to person, place and time.         Diagnostic Data:    ECG 12 Lead    Date/Time: 1/5/2023 11:20 AM  Performed by: Ralf Goins PA-C  Authorized by: Ralf Goins PA-C   Previous ECG: no previous ECG available  Rhythm: sinus bradycardia  BPM: 50  Conduction: conduction normal  ST Segments: ST segments normal  T Waves: T waves normal    Clinical impression: normal ECG          Lab Results   Component Value Date    TRIG 69 07/14/2022    HDL 84 (H) 07/14/2022     Lab Results   Component Value Date    GLUCOSE 83 07/14/2022    BUN 10 07/14/2022    CREATININE 0.87 07/14/2022     07/14/2022    K 4.5 07/14/2022     07/14/2022    CO2 26.0 07/14/2022     Lab Results   Component Value Date    HGBA1C 5.50 07/14/2022     Lab Results   Component Value Date    WBC 5.03 12/08/2022    HGB 11.9 (L) 12/08/2022    HCT 37.5 12/08/2022     12/08/2022       ASSESSMENT:   Diagnosis Plan   1. Systolic heart failure, unspecified HF chronicity (HCC)        2. Paroxysmal atrial fibrillation (HCC)        3. Primary hypertension        4. Bradycardia, sinus            PLAN:  Systolic heart failure-noted on BULL in 2021 in setting of atrial fibrillation.  Will order echocardiogram to recheck EF.  If echocardiogram still shows decreased LV systolic function, will consider changing losartan to Entresto.    Paroxysmal atrial fibrillation/bradycardia- Patient  reports that she could previously tell when she was in atrial fibrillation.  She does not think she has been back in atrial fibrillation since her cardioversion.  She is bradycardic today in office to 50 bpm.  We will decrease her amiodarone to 100 mg daily and ultimately would prefer not remaining on this agent.  [id number] = 5.  Continue Xarelto and metoprolol    Hypertension- in office today slightly above goal of <130.  Awaiting echo results.  Patient is still with decreased LV systolic function, potential change from losartan to Entresto can add additional hypertension control.    Maricel Cartagena DO, thank you for referring Ms. Brunson for evaluation.  I have forwarded my electronically generated recommendations to you for review.  Please do not hesitate to call with any questions.    Scribed by Ralf Goins PA-C for Jakob Nichols MD, [facility]

## 2023-01-03 RX ORDER — TRAZODONE HYDROCHLORIDE 100 MG/1
TABLET ORAL
Qty: 30 TABLET | Refills: 0 | Status: SHIPPED | OUTPATIENT
Start: 2023-01-03 | End: 2023-02-01

## 2023-01-03 RX ORDER — LOSARTAN POTASSIUM 50 MG/1
TABLET ORAL
Qty: 60 TABLET | Refills: 0 | Status: SHIPPED | OUTPATIENT
Start: 2023-01-03 | End: 2023-02-01

## 2023-01-03 RX ORDER — AMIODARONE HYDROCHLORIDE 200 MG/1
TABLET ORAL
Qty: 30 TABLET | Refills: 0 | Status: SHIPPED | OUTPATIENT
Start: 2023-01-03 | End: 2023-01-05 | Stop reason: DRUGHIGH

## 2023-01-03 RX ORDER — METOPROLOL TARTRATE 50 MG/1
TABLET, FILM COATED ORAL
Qty: 60 TABLET | Refills: 0 | Status: SHIPPED | OUTPATIENT
Start: 2023-01-03 | End: 2023-02-01

## 2023-01-05 ENCOUNTER — OFFICE VISIT (OUTPATIENT)
Dept: CARDIOLOGY | Facility: CLINIC | Age: 78
End: 2023-01-05
Payer: MEDICARE

## 2023-01-05 VITALS
HEART RATE: 50 BPM | HEIGHT: 61 IN | OXYGEN SATURATION: 98 % | BODY MASS INDEX: 27.19 KG/M2 | DIASTOLIC BLOOD PRESSURE: 84 MMHG | WEIGHT: 144 LBS | SYSTOLIC BLOOD PRESSURE: 138 MMHG

## 2023-01-05 DIAGNOSIS — I50.22 SYSTOLIC CHF, CHRONIC: ICD-10-CM

## 2023-01-05 DIAGNOSIS — R00.1 BRADYCARDIA, SINUS: ICD-10-CM

## 2023-01-05 DIAGNOSIS — I48.0 PAROXYSMAL ATRIAL FIBRILLATION: ICD-10-CM

## 2023-01-05 DIAGNOSIS — I10 PRIMARY HYPERTENSION: ICD-10-CM

## 2023-01-05 DIAGNOSIS — I50.20 SYSTOLIC HEART FAILURE, UNSPECIFIED HF CHRONICITY: Primary | ICD-10-CM

## 2023-01-05 PROCEDURE — 93000 ELECTROCARDIOGRAM COMPLETE: CPT

## 2023-01-05 PROCEDURE — 99203 OFFICE O/P NEW LOW 30 MIN: CPT | Performed by: INTERNAL MEDICINE

## 2023-01-05 RX ORDER — AMIODARONE HYDROCHLORIDE 200 MG/1
100 TABLET ORAL DAILY
Qty: 90 TABLET | Refills: 1 | Status: SHIPPED | OUTPATIENT
Start: 2023-01-05

## 2023-01-27 ENCOUNTER — TELEPHONE (OUTPATIENT)
Dept: CARDIOLOGY | Facility: CLINIC | Age: 78
End: 2023-01-27
Payer: MEDICARE

## 2023-01-27 ENCOUNTER — HOSPITAL ENCOUNTER (OUTPATIENT)
Dept: CARDIOLOGY | Facility: HOSPITAL | Age: 78
Discharge: HOME | End: 2023-01-27
Payer: MEDICARE

## 2023-01-27 VITALS — BODY MASS INDEX: 29.03 KG/M2 | HEIGHT: 59 IN | WEIGHT: 144 LBS

## 2023-01-27 DIAGNOSIS — I50.22 SYSTOLIC CHF, CHRONIC: ICD-10-CM

## 2023-01-27 DIAGNOSIS — I50.20 SYSTOLIC HEART FAILURE, UNSPECIFIED HF CHRONICITY: ICD-10-CM

## 2023-01-27 LAB
ASCENDING AORTA: 4.1 CM
BH CV ECHO MEAS - AI P1/2T: 807.1 MSEC
BH CV ECHO MEAS - AO MAX PG: 11.4 MMHG
BH CV ECHO MEAS - AO MEAN PG: 5.3 MMHG
BH CV ECHO MEAS - AO ROOT AREA (BSA CORRECTED): 2.2 CM2
BH CV ECHO MEAS - AO ROOT DIAM: 3.5 CM
BH CV ECHO MEAS - AO V2 MAX: 169.1 CM/SEC
BH CV ECHO MEAS - AO V2 VTI: 40.9 CM
BH CV ECHO MEAS - AVA(I,D): 1.84 CM2
BH CV ECHO MEAS - EDV(CUBED): 173.4 ML
BH CV ECHO MEAS - EDV(MOD-SP2): 112 ML
BH CV ECHO MEAS - EDV(MOD-SP4): 103 ML
BH CV ECHO MEAS - EF(MOD-BP): 78 %
BH CV ECHO MEAS - EF(MOD-SP2): 71.4 %
BH CV ECHO MEAS - EF(MOD-SP4): 82.5 %
BH CV ECHO MEAS - ESV(CUBED): 29 ML
BH CV ECHO MEAS - ESV(MOD-SP2): 32 ML
BH CV ECHO MEAS - ESV(MOD-SP4): 18 ML
BH CV ECHO MEAS - FS: 44.9 %
BH CV ECHO MEAS - IVS/LVPW: 1.08 CM
BH CV ECHO MEAS - IVSD: 1.15 CM
BH CV ECHO MEAS - LA DIMENSION: 4 CM
BH CV ECHO MEAS - LV DIASTOLIC VOL/BSA (35-75): 62.7 CM2
BH CV ECHO MEAS - LV MASS(C)D: 249.5 GRAMS
BH CV ECHO MEAS - LV MAX PG: 3.4 MMHG
BH CV ECHO MEAS - LV MEAN PG: 1.59 MMHG
BH CV ECHO MEAS - LV SYSTOLIC VOL/BSA (12-30): 11 CM2
BH CV ECHO MEAS - LV V1 MAX: 91.8 CM/SEC
BH CV ECHO MEAS - LV V1 VTI: 22.6 CM
BH CV ECHO MEAS - LVIDD: 5.6 CM
BH CV ECHO MEAS - LVIDS: 3.1 CM
BH CV ECHO MEAS - LVOT AREA: 3.3 CM2
BH CV ECHO MEAS - LVOT DIAM: 2.06 CM
BH CV ECHO MEAS - LVPWD: 1.06 CM
BH CV ECHO MEAS - MV A MAX VEL: 58.7 CM/SEC
BH CV ECHO MEAS - MV DEC SLOPE: 269.5 CM/SEC2
BH CV ECHO MEAS - MV DEC TIME: 0.23 MSEC
BH CV ECHO MEAS - MV E MAX VEL: 109.8 CM/SEC
BH CV ECHO MEAS - MV E/A: 1.87
BH CV ECHO MEAS - MV MAX PG: 5 MMHG
BH CV ECHO MEAS - MV MEAN PG: 1.37 MMHG
BH CV ECHO MEAS - MV P1/2T: 123.9 MSEC
BH CV ECHO MEAS - MV V2 VTI: 46.8 CM
BH CV ECHO MEAS - MVA(P1/2T): 1.78 CM2
BH CV ECHO MEAS - MVA(VTI): 1.61 CM2
BH CV ECHO MEAS - PA ACC SLOPE: 624.1 CM/SEC2
BH CV ECHO MEAS - PA ACC TIME: 0.14 SEC
BH CV ECHO MEAS - PA PR(ACCEL): 17.2 MMHG
BH CV ECHO MEAS - PA V2 MAX: 100.3 CM/SEC
BH CV ECHO MEAS - RAP SYSTOLE: 3 MMHG
BH CV ECHO MEAS - RVSP: 33 MMHG
BH CV ECHO MEAS - SI(MOD-SP2): 48.7 ML/M2
BH CV ECHO MEAS - SI(MOD-SP4): 51.7 ML/M2
BH CV ECHO MEAS - SV(LVOT): 75.1 ML
BH CV ECHO MEAS - SV(MOD-SP2): 80 ML
BH CV ECHO MEAS - SV(MOD-SP4): 85 ML
BH CV ECHO MEAS - TAPSE (>1.6): 2.8 CM
BH CV ECHO MEAS - TR MAX PG: 30 MMHG
BH CV ECHO MEAS - TR MAX VEL: 273.4 CM/SEC
BH CV VAS BP LEFT ARM: NORMAL MMHG
BH CV XLRA - RV BASE: 3.6 CM
BH CV XLRA - RV LENGTH: 6.8 CM
BH CV XLRA - RV MID: 2.1 CM
BH CV XLRA - TDI S': 13.2 CM/SEC
IVRT: 169 MSEC
LEFT ATRIUM VOLUME INDEX: 47 ML/M2
MAXIMAL PREDICTED HEART RATE: 142 BPM
STRESS TARGET HR: 121 BPM

## 2023-01-27 PROCEDURE — 93306 TTE W/DOPPLER COMPLETE: CPT | Performed by: INTERNAL MEDICINE

## 2023-01-27 PROCEDURE — 93306 TTE W/DOPPLER COMPLETE: CPT

## 2023-01-27 NOTE — TELEPHONE ENCOUNTER
Patient was called to discuss echocardiogram results. Echo was within normal limits. She had a previous echo with a decreased EF, but EF >70% on updated echo. Grade II LV diastolic dysfunction was discussed as well as review of her heart failure medications and measures to avoid heart failure exacerbations. Patient was given opportunity for questions and any questions were answered. Will f/u in office in 1 year    Ralf Goins PA-C

## 2023-02-01 RX ORDER — TRAZODONE HYDROCHLORIDE 100 MG/1
TABLET ORAL
Qty: 30 TABLET | Refills: 0 | Status: SHIPPED | OUTPATIENT
Start: 2023-02-01 | End: 2023-03-03

## 2023-02-01 RX ORDER — LOSARTAN POTASSIUM 50 MG/1
TABLET ORAL
Qty: 60 TABLET | Refills: 0 | Status: SHIPPED | OUTPATIENT
Start: 2023-02-01 | End: 2023-03-03

## 2023-02-01 RX ORDER — METOPROLOL TARTRATE 50 MG/1
TABLET, FILM COATED ORAL
Qty: 60 TABLET | Refills: 0 | Status: SHIPPED | OUTPATIENT
Start: 2023-02-01 | End: 2023-03-03

## 2023-02-24 ENCOUNTER — HOSPITAL ENCOUNTER (OUTPATIENT)
Dept: BONE DENSITY | Facility: HOSPITAL | Age: 78
Discharge: HOME | End: 2023-02-24
Admitting: STUDENT IN AN ORGANIZED HEALTH CARE EDUCATION/TRAINING PROGRAM
Payer: MEDICARE

## 2023-02-24 DIAGNOSIS — Z78.0 ENCOUNTER FOR OSTEOPOROSIS SCREENING IN ASYMPTOMATIC POSTMENOPAUSAL PATIENT: ICD-10-CM

## 2023-02-24 DIAGNOSIS — Z13.820 ENCOUNTER FOR OSTEOPOROSIS SCREENING IN ASYMPTOMATIC POSTMENOPAUSAL PATIENT: ICD-10-CM

## 2023-02-24 PROCEDURE — 77080 DXA BONE DENSITY AXIAL: CPT

## 2023-03-03 RX ORDER — TRAZODONE HYDROCHLORIDE 100 MG/1
TABLET ORAL
Qty: 30 TABLET | Refills: 0 | Status: SHIPPED | OUTPATIENT
Start: 2023-03-03

## 2023-03-03 RX ORDER — LOSARTAN POTASSIUM 50 MG/1
TABLET ORAL
Qty: 60 TABLET | Refills: 0 | Status: SHIPPED | OUTPATIENT
Start: 2023-03-03

## 2023-03-03 RX ORDER — METOPROLOL TARTRATE 50 MG/1
TABLET, FILM COATED ORAL
Qty: 60 TABLET | Refills: 0 | Status: SHIPPED | OUTPATIENT
Start: 2023-03-03

## 2023-03-09 ENCOUNTER — OFFICE VISIT (OUTPATIENT)
Dept: FAMILY MEDICINE CLINIC | Facility: CLINIC | Age: 78
End: 2023-03-09
Payer: MEDICARE

## 2023-03-09 VITALS
BODY MASS INDEX: 30.4 KG/M2 | WEIGHT: 150.8 LBS | OXYGEN SATURATION: 95 % | HEART RATE: 55 BPM | RESPIRATION RATE: 19 BRPM | TEMPERATURE: 97.6 F | HEIGHT: 59 IN | SYSTOLIC BLOOD PRESSURE: 134 MMHG | DIASTOLIC BLOOD PRESSURE: 80 MMHG

## 2023-03-09 DIAGNOSIS — Z11.59 ENCOUNTER FOR SCREENING FOR OTHER VIRAL DISEASES: ICD-10-CM
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
## 2023-03-09 DIAGNOSIS — R93.3 ABNORMAL FINDINGS ON DIAGNOSTIC IMAGING OF OTHER PARTS OF DIGESTIVE TRACT: ICD-10-CM

## 2023-03-09 DIAGNOSIS — D64.9 ANEMIA, UNSPECIFIED TYPE: ICD-10-CM

## 2023-03-09 DIAGNOSIS — M11.20 CALCIUM PYROPHOSPHATE DEPOSITION DISEASE (CPPD): ICD-10-CM

## 2023-03-09 DIAGNOSIS — M67.432 GANGLION OF LEFT WRIST: ICD-10-CM

## 2023-03-09 PROBLEM — Z80.3 FAMILY HISTORY OF BREAST CANCER: Status: ACTIVE | Noted: 2022-08-10

## 2023-03-09 PROCEDURE — 1160F RVW MEDS BY RX/DR IN RCRD: CPT | Performed by: STUDENT IN AN ORGANIZED HEALTH CARE EDUCATION/TRAINING PROGRAM

## 2023-03-09 PROCEDURE — 3079F DIAST BP 80-89 MM HG: CPT | Performed by: STUDENT IN AN ORGANIZED HEALTH CARE EDUCATION/TRAINING PROGRAM

## 2023-03-09 PROCEDURE — 99214 OFFICE O/P EST MOD 30 MIN: CPT | Performed by: STUDENT IN AN ORGANIZED HEALTH CARE EDUCATION/TRAINING PROGRAM

## 2023-03-09 PROCEDURE — 1159F MED LIST DOCD IN RCRD: CPT | Performed by: STUDENT IN AN ORGANIZED HEALTH CARE EDUCATION/TRAINING PROGRAM

## 2023-03-09 PROCEDURE — 96372 THER/PROPH/DIAG INJ SC/IM: CPT | Performed by: STUDENT IN AN ORGANIZED HEALTH CARE EDUCATION/TRAINING PROGRAM

## 2023-03-09 PROCEDURE — 3075F SYST BP GE 130 - 139MM HG: CPT | Performed by: STUDENT IN AN ORGANIZED HEALTH CARE EDUCATION/TRAINING PROGRAM

## 2023-03-09 RX ADMIN — CYANOCOBALAMIN 1000 MCG: 1000 INJECTION, SOLUTION INTRAMUSCULAR; SUBCUTANEOUS at 10:31

## 2023-03-09 NOTE — PROGRESS NOTES
"Chief Complaint  Hypertension (Follow up on anemia ) and Cyst (Both wrist)    History of Present Illness     Wrist pain    Since the last time I saw the patient she has developed an enlarged cyst on the back of her wrist.     htn  Controlled  No s/e from meds      The following portions of the patient's history were reviewed and updated as appropriate: allergies, current medications, past family history, past medical history, past social history, past surgical history, and problem list.    OBJECTIVE:  /80   Pulse 55   Temp 97.6 °F (36.4 °C)   Resp 19   Ht 150 cm (59.06\")   Wt 68.4 kg (150 lb 12.8 oz)   SpO2 95%   BMI 30.40 kg/m²       Physical Exam  Constitutional:       General: She is not in acute distress.     Appearance: Normal appearance.   HENT:      Head: Normocephalic and atraumatic.   Eyes:      Extraocular Movements: Extraocular movements intact.   Cardiovascular:      Rate and Rhythm: Normal rate and regular rhythm.      Heart sounds: No murmur heard.  Pulmonary:      Effort: Pulmonary effort is normal. No respiratory distress.      Breath sounds: Normal breath sounds. No stridor. No wheezing, rhonchi or rales.   Musculoskeletal:      Comments: Left wrist has enlarged mobile cyst feeling structure. No redness or fluid. No wound. It is not warm or painful to the touch. Consistent with ganglion cyst.   Bilateral wrists with bony enlargements as well at baseline.    Skin:     Findings: No rash.   Neurological:      General: No focal deficit present.      Mental Status: She is alert.   Psychiatric:         Mood and Affect: Mood normal.                    Assessment and Plan   Diagnoses and all orders for this visit:    1. Accident caused by hypodermic needle, sequela (Primary)  -     HIV-1 / O / 2 Ag / Antibody 4th Generation; Future  -     Hepatitis C Antibody; Future  -     Hepatitis B Virus (HBV) Screening and Diagnosis; Future    2. Encounter for screening for other viral diseases  -     " Hepatitis B Virus (HBV) Screening and Diagnosis; Future    3. Anemia, unspecified type  -     CBC (No Diff); Future  -     Ferritin; Future  -     Iron Profile; Future  -     Vitamin B12; Future  -     TSH Rfx On Abnormal To Free T4; Future  -     Lipid panel; Future    4. Abnormal findings on diagnostic imaging of other parts of digestive tract  -     Lipid panel; Future    5. Ganglion of left wrist  -     Ambulatory Referral to Orthopedic Surgery    6. Calcium pyrophosphate deposition disease (CPPD)  -     Ambulatory Referral to Rheumatology    continue current meds.   Reviewed cardiology note. Amiodarone was decreased.   Sent message to staff to check on rheumatology referral.     Osteoporosis  Patient declines med management. Discussed possible options such as fosamax or prolia.   Recommend weight bearing exercise, 1200 mg calcium daily in diet or in over the counter supplement and 1000 units vitamin d daily.    Counseled on cessation of alcohol use. She declines referral to counseling/psychiatry at this time.     She has chronic wrist pain with imaging showing calcium pyrophosphate deposition disease. She is not a candidate for nsaid. Will refer to rheumatology to consider anakinra.     Given that her ganglion cyst isbecoming large and bothersome (over an inch in size) I will refer her to orthopedics for removal. Advised er for any pain fever or redness/drainage of the area.       Return in about 3 months (around 6/9/2023).       Maricel Cartagena D.O.  [facility]

## 2023-03-10 ENCOUNTER — HOSPITAL ENCOUNTER (OUTPATIENT)
Dept: MAMMOGRAPHY | Facility: HOSPITAL | Age: 78
Discharge: HOME | End: 2023-03-10
Admitting: STUDENT IN AN ORGANIZED HEALTH CARE EDUCATION/TRAINING PROGRAM
Payer: MEDICARE

## 2023-03-10 DIAGNOSIS — Z12.31 ENCOUNTER FOR SCREENING MAMMOGRAM FOR MALIGNANT NEOPLASM OF BREAST: ICD-10-CM

## 2023-03-10 PROCEDURE — 77063 BREAST TOMOSYNTHESIS BI: CPT | Performed by: RADIOLOGY

## 2023-03-10 PROCEDURE — 77063 BREAST TOMOSYNTHESIS BI: CPT

## 2023-03-10 PROCEDURE — 77067 SCR MAMMO BI INCL CAD: CPT

## 2023-03-10 PROCEDURE — 77067 SCR MAMMO BI INCL CAD: CPT | Performed by: RADIOLOGY

## 2023-03-22 ENCOUNTER — TELEPHONE (OUTPATIENT)
Dept: FAMILY MEDICINE CLINIC | Facility: CLINIC | Age: 78
End: 2023-03-22

## 2023-03-22 NOTE — TELEPHONE ENCOUNTER
There is not a website included here. If this is available online would you mind to please print for me and I will take a look?

## 2023-03-22 NOTE — TELEPHONE ENCOUNTER
[facility]    Best call back number: [phone]    What form or medical record are you requesting: UTILITY ATTESTATION    Who is requesting this form or medical record from you: [facility]    How would you like to receive the form or medical records (pick-up, mail, fax): REQUESTING THE FORM BE FILLED OUT AT THE WEBSITE LISTED BELOW      Timeframe paperwork needed: AS SOON AS POSSIBLE    Additional notes: [employer] IS THE WEBSITE TO FILL OUT THE FORM . PROVIDER SERVICE TELEPHONE # is [phone]

## 2023-03-23 ENCOUNTER — OFFICE VISIT (OUTPATIENT)
Dept: ORTHOPEDIC SURGERY | Facility: CLINIC | Age: 78
End: 2023-03-23
Payer: MEDICARE

## 2023-03-23 VITALS
WEIGHT: 150.79 LBS | HEIGHT: 59 IN | SYSTOLIC BLOOD PRESSURE: 158 MMHG | BODY MASS INDEX: 30.4 KG/M2 | DIASTOLIC BLOOD PRESSURE: 96 MMHG

## 2023-03-23 DIAGNOSIS — M67.431 GANGLION CYST OF DORSUM OF RIGHT WRIST: ICD-10-CM

## 2023-03-23 DIAGNOSIS — M25.532 LEFT WRIST PAIN: Primary | ICD-10-CM

## 2023-03-23 DIAGNOSIS — M67.432 GANGLION CYST OF DORSUM OF LEFT WRIST: ICD-10-CM

## 2023-03-23 DIAGNOSIS — M19.032 PRIMARY OSTEOARTHRITIS OF LEFT WRIST: ICD-10-CM

## 2023-03-23 PROCEDURE — 20612 ASPIRATE/INJ GANGLION CYST: CPT | Performed by: STUDENT IN AN ORGANIZED HEALTH CARE EDUCATION/TRAINING PROGRAM

## 2023-03-23 PROCEDURE — 3077F SYST BP >= 140 MM HG: CPT | Performed by: STUDENT IN AN ORGANIZED HEALTH CARE EDUCATION/TRAINING PROGRAM

## 2023-03-23 PROCEDURE — 99204 OFFICE O/P NEW MOD 45 MIN: CPT | Performed by: STUDENT IN AN ORGANIZED HEALTH CARE EDUCATION/TRAINING PROGRAM

## 2023-03-23 PROCEDURE — 1159F MED LIST DOCD IN RCRD: CPT | Performed by: STUDENT IN AN ORGANIZED HEALTH CARE EDUCATION/TRAINING PROGRAM

## 2023-03-23 PROCEDURE — 3080F DIAST BP >= 90 MM HG: CPT | Performed by: STUDENT IN AN ORGANIZED HEALTH CARE EDUCATION/TRAINING PROGRAM

## 2023-03-23 PROCEDURE — 1160F RVW MEDS BY RX/DR IN RCRD: CPT | Performed by: STUDENT IN AN ORGANIZED HEALTH CARE EDUCATION/TRAINING PROGRAM

## 2023-03-23 RX ORDER — TRIAMCINOLONE ACETONIDE 40 MG/ML
40 INJECTION, SUSPENSION INTRA-ARTICULAR; INTRAMUSCULAR
Status: COMPLETED | OUTPATIENT
Start: 2023-03-23 | End: 2023-03-23

## 2023-03-23 RX ADMIN — TRIAMCINOLONE ACETONIDE 40 MG: 40 INJECTION, SUSPENSION INTRA-ARTICULAR; INTRAMUSCULAR at 10:45

## 2023-03-23 NOTE — PROGRESS NOTES
Procedure   - Hand/Upper Extremity Injection: L wrist for dorsal carpal ganglion on 3/23/2023 10:45 AM  Indications: pain  Details: 18 G needle, dorsal approach  Medications: 40 mg triamcinolone acetonide 40 MG/ML  Aspirate: 3 mL clear  Outcome: tolerated well, no immediate complications  Procedure, treatment alternatives, risks and benefits explained, specific risks discussed. Consent was given by the patient. Immediately prior to procedure a time out was called to verify the correct patient, procedure, equipment, support staff and site/side marked as required. Patient was prepped and draped in the usual sterile fashion.

## 2023-03-23 NOTE — TELEPHONE ENCOUNTER
It looks like she does not qualify at this time based on no recent hospitalizations/frequent er visits.

## 2023-03-23 NOTE — PROGRESS NOTES
[facility] Orthopaedic Surgery Office Visit     Office Visit       Date: 03/23/2023   Patient Name: Catrachita Brunson  MRN: [id number]  YOB: 1945    Referring Physician: Maricel Cartagena DO     Chief Complaint:   Chief Complaint   Patient presents with   • Left Wrist - Pain, Edema   • Right Wrist - Pain, Edema       History of Present Illness:   Catrachita Brunson is a 77 y.o. female who presents with new problem of: left wrist pain.  Onset: atraumatic and gradual in nature. The issue has been ongoing for several  year(s). Pain is a 8/10 on the pain scale. Pain is described as stabbing and sharp . Associated symptoms include pain, swelling and stiffness. The pain is worse with leisure; ice and heat improve the pain. Previous treatments have included: nothing.    Subjective   Review of Systems: Review of Systems   Constitutional: Negative for chills, fever, unexpected weight gain and unexpected weight loss.   HENT: Negative for congestion, postnasal drip and rhinorrhea.    Eyes: Negative for blurred vision.   Respiratory: Negative for shortness of breath.    Cardiovascular: Negative for leg swelling.   Gastrointestinal: Negative for abdominal pain, nausea and vomiting.   Genitourinary: Negative for difficulty urinating.   Musculoskeletal: Positive for arthralgias. Negative for gait problem, joint swelling and myalgias.   Skin: Negative for skin lesions and wound.   Neurological: Negative for dizziness, weakness, light-headedness and numbness.   Hematological: Does not bruise/bleed easily.   Psychiatric/Behavioral: Negative for depressed mood.        I have reviewed the following portions of the patient's history:History of Present Illness and review of systems.    Past Medical History:   Past Medical History:   Diagnosis Date   • Arrhythmia    • Goiter    • Hypertension        Past Surgical History:   Past Surgical History:   Procedure Laterality Date   • CARPAL TUNNEL RELEASE      • FOOT SURGERY Bilateral    • GASTRIC BYPASS     • HYSTERECTOMY     • JOINT REPLACEMENT      both knees, both shoulders   • OOPHORECTOMY     • SPINE SURGERY      cervicla disk replacement   • THYROID SURGERY         Family History:   Family History   Problem Relation Age of Onset   • Breast cancer Mother    • Cancer Mother    • Cancer Father    • Breast cancer Sister    • Cancer Sister    • Ovarian cancer Neg Hx        Social History:   Social History     Socioeconomic History   • Marital status: Single   Tobacco Use   • Smoking status: Never   • Smokeless tobacco: Never   Vaping Use   • Vaping Use: Never used   Substance and Sexual Activity   • Alcohol use: Yes     Alcohol/week: 7.0 standard drinks     Types: 7 Cans of beer per week   • Drug use: Never   • Sexual activity: Defer       Medications:   Current Outpatient Medications:   •  amiodarone (PACERONE) 200 MG tablet, Take 0.5 tablets by mouth Daily., Disp: 90 tablet, Rfl: 1  •  Diclofenac Sodium (VOLTAREN) 1 % gel gel, Apply 4 grams topically to right knee QID prn pain, Disp: 100 g, Rfl: 0  •  Loratadine 10 MG capsule, Take 1 capsule by mouth Daily., Disp: 30 each, Rfl: 0  •  losartan (COZAAR) 50 MG tablet, TAKE 1 TABLET BY MOUTH TWICE DAILY HOLD  FOR  SYSTOLIC  BLOOD  PRESSURE  LESS  THAN  110, Disp: 60 tablet, Rfl: 0  •  metoprolol tartrate (LOPRESSOR) 50 MG tablet, Take 1 tablet by mouth twice daily, Disp: 60 tablet, Rfl: 0  •  traZODone (DESYREL) 100 MG tablet, TAKE 1 TABLET BY MOUTH ONCE DAILY AT NIGHT, Disp: 30 tablet, Rfl: 0  •  vitamin B-12 (CYANOCOBALAMIN) 1000 MCG tablet, Take 1 tablet by mouth Daily., Disp: 90 tablet, Rfl: 3  •  Xarelto 20 MG tablet, Take 1 tablet by mouth Daily., Disp: 90 tablet, Rfl: 0    Current Facility-Administered Medications:   •  cyanocobalamin injection 1,000 mcg, 1,000 mcg, Intramuscular, Q28 Days, Maricel Cartagena DO, 1,000 mcg at 03/09/23 1031    Allergies:   Allergies   Allergen Reactions   • Penicillins Anaphylaxis  "      I reviewed the patient's chief complaint, history of present illness, review of systems, past medical history, surgical history, family history, social history, medications and allergy list.     Objective    Vital Signs:   Vitals:    03/23/23 0950   BP: 158/96   Weight: 68.4 kg (150 lb 12.7 oz)   Height: 150 cm (59.06\")     Body mass index is 30.4 kg/m².   BMI is >= 30 and <35. (Class 1 Obesity). The following options were offered after discussion;: exercise counseling/recommendations and nutrition counseling/recommendations     Patient reports that she is a non-smoker and has not ever been a smoker.  This behavior was applauded and she was encouraged to continue in smoking cessation.  We will continue to monitor at subsequent visits.    Ortho Exam:  Constitutional: General Appearance: healthy-appearing, NAD, and normal body habitus.   Psychiatric: Orientation: oriented to person, place, and time. Mood and Affect: normal mood and affect and active and alert.   left hand, wrist and forearm exam:  palpable mass dorsal radial wrist, fixed and well circumscribed, no sign of infection   pain with active wrist flexion   negative James test  Mild tenderness about the distal wrist.   Positive tenderness along the first extensor compartment.   Negative Finkelstein's test. Sensation grossly intact to all digits.   No tenderness in the forearm or lateral epicondyle.   No Pain with resisted thumb extension and abduction.   No obvious deformity present.   full range of motion of all fingers.  right hand/wrist/forearm exam:   No obvious deformity present.   No evidence of swelling or tenderness.   Full range of motion of all fingers.  Right wrist: Smaller but yet still palpable mass on the dorsal radial wrist that is fixed and well circumcised.  No sign of infection.  Full range of motion in the wrist as well as fingers.  Tender to palpation over the mass.    Results Review:   Imaging Results (Last 24 Hours)     Procedure " Component Value Units Date/Time    XR Wrist 3+ View Bilateral [[id number]] Resulted: 03/23/23 1035     Updated: 03/23/23 1044    Narrative:      Indication: Bilateral wrist pain and swelling    Views: AP oblique and lateral views of the wrist are submitted.     Impression: There is no fracture subluxation or dislocation.  Significant   degenerative changes present in both radiocarpal joints as well as diffuse   degenerative changes of the MCP and interphalangeal joints of most all   fingers.  Large radial and dorsal side of the soft tissue mass to the left   wrist.  Dorsal sided soft tissue swelling to the right wrist.    Comparison: No additional images available for comparison review.            Procedures    Assessment / Plan    Assessment/Plan:   Diagnoses and all orders for this visit:    1. Left wrist pain (Primary)  -     Cancel: XR Wrist 3+ View Left  -     XR Wrist 3+ View Bilateral    2. Ganglion cyst of dorsum of left wrist    3. Ganglion cyst of dorsum of right wrist    4. Primary osteoarthritis of left wrist    Other orders  -     Cancel: Medium Joint Arthrocentesis  -     - Hand/Upper Extremity Injection: L wrist      Multiple years of left wrist pain and swelling from dorsal ganglion cyst.  Has more recently developed 1 on the right that is much smaller in size.  Ping-pong sized ball to the left radial dorsal wrist.  Radiographs of the wrist show significant degenerative changes specially in the radiocarpal joint and diffusely throughout the hand.  We discussed these radiographic findings and how the lead to the symptoms being experienced.  We talked about ganglion cyst how they form and multiple treatment options.  After discussion, we elected to aspirate and drained the cyst today.  I then injected 1 cc of Kenalog into the sac contents.  We applied a compression dressing with Bactroban.  We will get at least 3 cc of gelatinous fluid out.  The hardest part will be keeping it from returning.  We will  keep her on topical Voltaren gel for pain in the wrist and hand.  We cannot add any additional anti-inflammatory medication given that she is anticoagulated with Xarelto.  Ganglion cyst of the dorsum of the right wrist is much smaller and we can look to treat it in the future if symptoms respond to treatment today.  I will see her back in 4 weeks to monitor response and decide on additional treatment options.    Previous documentation reviewed: 3/9/2023-office visit-Maricel Cartagena DO.    Previous lab results reviewed: 7/14/2022-CRP less than 0.30.    Previous images as reviewed: 2/24/2023-DEXA scan.    Follow Up:   Return in about 4 weeks (around 4/20/2023) for Recheck.      Kirby Perales MD  [facility]

## 2023-04-20 ENCOUNTER — OFFICE VISIT (OUTPATIENT)
Dept: ORTHOPEDIC SURGERY | Facility: CLINIC | Age: 78
End: 2023-04-20
Payer: MEDICARE

## 2023-04-20 VITALS
WEIGHT: 150.79 LBS | SYSTOLIC BLOOD PRESSURE: 156 MMHG | HEIGHT: 59 IN | DIASTOLIC BLOOD PRESSURE: 92 MMHG | BODY MASS INDEX: 30.4 KG/M2

## 2023-04-20 DIAGNOSIS — M67.432 GANGLION CYST OF DORSUM OF LEFT WRIST: Primary | ICD-10-CM

## 2023-04-20 DIAGNOSIS — M19.032 PRIMARY OSTEOARTHRITIS OF LEFT WRIST: ICD-10-CM

## 2023-04-20 DIAGNOSIS — M67.431 GANGLION CYST OF DORSUM OF RIGHT WRIST: ICD-10-CM

## 2023-04-20 NOTE — PROGRESS NOTES
[facility] [place] [facility] Office Follow Up Visit     Office Follow Up      Date: 04/20/2023   Patient Name: Catrachita Brunson  MRN: [id number]  YOB: 1945    Referring Physician: No ref. provider found     Chief Complaint:   Chief Complaint   Patient presents with   • Follow-up     4 week f/u-- Primary osteoarthritis of left wrist; Ganglion cyst of dorsum of both wrist     History of Present Illness: Catrachita Brunson is a 77 y.o. female who is here today for follow up on bilateral dorsal wrist ganglion cysts.  At last visit, we aspirated the left ganglion cyst as it was much larger, painful, and inhibiting.  Pain greatly improved since that time.  Size is still reduced.  Pain is less.  She can move her wrist as desired.    Subjective   Review of Systems: Review of Systems   Constitutional: Negative for chills, fever, unexpected weight gain and unexpected weight loss.   HENT: Negative for congestion, postnasal drip and rhinorrhea.    Eyes: Negative for blurred vision.   Respiratory: Negative for shortness of breath.    Cardiovascular: Negative for leg swelling.   Gastrointestinal: Negative for abdominal pain, nausea and vomiting.   Genitourinary: Negative for difficulty urinating.   Musculoskeletal: Positive for arthralgias. Negative for gait problem, joint swelling and myalgias.   Skin: Negative for skin lesions and wound.   Neurological: Negative for dizziness, weakness, light-headedness and numbness.   Hematological: Does not bruise/bleed easily.   Psychiatric/Behavioral: Negative for depressed mood.        Medications:   Current Outpatient Medications:   •  amiodarone (PACERONE) 200 MG tablet, Take 0.5 tablets by mouth Daily., Disp: 90 tablet, Rfl: 1  •  Diclofenac Sodium (VOLTAREN) 1 % gel gel, Apply 4 grams topically to right knee QID prn pain, Disp: 100 g, Rfl: 0  •  Loratadine 10 MG capsule, Take 1 capsule by mouth Daily., Disp: 30 each, Rfl: 0  •  losartan  "(COZAAR) 50 MG tablet, TAKE 1 TABLET BY MOUTH TWICE DAILY HOLD  FOR  SYSTOLIC  BLOOD  PRESSURE  LESS  THAN  110, Disp: 60 tablet, Rfl: 0  •  metoprolol tartrate (LOPRESSOR) 50 MG tablet, Take 1 tablet by mouth twice daily, Disp: 60 tablet, Rfl: 0  •  traZODone (DESYREL) 100 MG tablet, TAKE 1 TABLET BY MOUTH ONCE DAILY AT NIGHT, Disp: 30 tablet, Rfl: 0  •  vitamin B-12 (CYANOCOBALAMIN) 1000 MCG tablet, Take 1 tablet by mouth Daily., Disp: 90 tablet, Rfl: 3  •  Xarelto 20 MG tablet, Take 1 tablet by mouth Daily., Disp: 90 tablet, Rfl: 0    Current Facility-Administered Medications:   •  cyanocobalamin injection 1,000 mcg, 1,000 mcg, Intramuscular, Q28 Days, Maricel Cartagena DO, 1,000 mcg at 03/09/23 1031    Allergies:   Allergies   Allergen Reactions   • Penicillins Anaphylaxis       I have reviewed and updated the patient's chief complaint, history of present illness, review of systems, past medical history, surgical history, family history, social history, medications and allergy list as appropriate.     Objective    Vital Signs:   Vitals:    04/20/23 1021   BP: 156/92   Weight: 68.4 kg (150 lb 12.7 oz)   Height: 150 cm (59.06\")     Body mass index is 30.4 kg/m².  BMI is >= 30 and <35. (Class 1 Obesity). The following options were offered after discussion;: exercise counseling/recommendations and nutrition counseling/recommendations    Patient reports that she is a non-smoker and has not ever been a smoker.  This behavior was applauded and she was encouraged to continue in smoking cessation.  We will continue to monitor at subsequent visits.    Ortho Exam:  General: Well-appearing, no acute distress  Left wrist: Multiple small palpable dorsal ganglion cyst.  Nontender to palpation.  No sign of infection or bruising.  Limited but pain-free range of motion.  Sensation intact to light touch.  Right wrist: Small palpable dorsal ganglion cyst.  Nontender to palpation.  No sign of infection or bruising.  Full pain-free " range of motion.  Sensation intact to light touch.    Results Review:   Imaging Results (Last 24 Hours)     ** No results found for the last 24 hours. **          Procedures    Assessment / Plan    Assessment/Plan:   Diagnoses and all orders for this visit:    1. Ganglion cyst of dorsum of left wrist (Primary)    2. Ganglion cyst of dorsum of right wrist    3. Primary osteoarthritis of left wrist      Follow-up on bilateral dorsal wrist ganglion cyst.  We aspirated the left wrist at last visit and size is greatly reduced.  Pain is controlled.  She has pain-free range of motion.  Right-sided cyst remains at previous size.  Not causing her any pain or limited range of motion.  I recommend using Voltaren gel for pain control as needed.  Continue using the wrist.  I did discuss with her that she has underlying wrist osteoarthritis and that the cyst may likely recur.  I am happy to aspirate either side as needed.  Follow-up will be as her symptoms dictate and for repeat aspiration.    Follow Up:   Return if symptoms worsen or fail to improve.      Kirby Perales MD  [facility] [place] [facility]

## 2023-05-10 ENCOUNTER — HOSPITAL ENCOUNTER (OUTPATIENT)
Dept: MAMMOGRAPHY | Facility: HOSPITAL | Age: 78
Discharge: HOME | End: 2023-05-10
Admitting: RADIOLOGY
Payer: MEDICARE

## 2023-05-10 DIAGNOSIS — R92.8 ABNORMAL MAMMOGRAM: ICD-10-CM

## 2023-05-10 PROCEDURE — 77066 DX MAMMO INCL CAD BI: CPT

## 2023-05-10 PROCEDURE — G0279 TOMOSYNTHESIS, MAMMO: HCPCS

## 2023-07-18 ENCOUNTER — HOSPITAL ENCOUNTER (OUTPATIENT)
Facility: HOSPITAL | Age: 78
Setting detail: OBSERVATION
Discharge: HOME | End: 2023-07-20
Attending: EMERGENCY MEDICINE | Admitting: INTERNAL MEDICINE
Payer: MEDICARE

## 2023-07-18 ENCOUNTER — APPOINTMENT (OUTPATIENT)
Dept: MRI IMAGING | Facility: HOSPITAL | Age: 78
End: 2023-07-18
Payer: MEDICARE

## 2023-07-18 ENCOUNTER — APPOINTMENT (OUTPATIENT)
Dept: GENERAL RADIOLOGY | Facility: HOSPITAL | Age: 78
End: 2023-07-18
Payer: MEDICARE

## 2023-07-18 DIAGNOSIS — I16.0 HYPERTENSIVE URGENCY: Primary | ICD-10-CM

## 2023-07-18 DIAGNOSIS — R42 DIZZINESS: ICD-10-CM

## 2023-07-18 DIAGNOSIS — G44.52 NEW DAILY PERSISTENT HEADACHE: ICD-10-CM

## 2023-07-18 PROBLEM — R77.8 ELEVATED TROPONIN: Status: ACTIVE | Noted: 2023-07-18

## 2023-07-18 PROBLEM — R79.89 ELEVATED TROPONIN: Status: ACTIVE | Noted: 2023-07-18
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
## 2023-07-18 PROBLEM — R29.6 FALLS: Status: ACTIVE | Noted: 2023-07-18

## 2023-07-18 PROBLEM — F10.10 ALCOHOL ABUSE: Status: ACTIVE | Noted: 2023-07-18

## 2023-07-18 LAB
ALBUMIN SERPL-MCNC: 4.6 G/DL (ref 3.5–5.2)
ALBUMIN/GLOB SERPL: 1.9 G/DL
ALP SERPL-CCNC: 60 U/L (ref 39–117)
ALT SERPL W P-5'-P-CCNC: 11 U/L (ref 1–33)
ANION GAP SERPL CALCULATED.3IONS-SCNC: 9 MMOL/L (ref 5–15)
AST SERPL-CCNC: 21 U/L (ref 1–32)
BACTERIA UR QL AUTO: ABNORMAL /HPF
BASOPHILS # BLD AUTO: 0.02 10*3/MM3 (ref 0–0.2)
BASOPHILS NFR BLD AUTO: 0.4 % (ref 0–1.5)
BILIRUB SERPL-MCNC: 0.5 MG/DL (ref 0–1.2)
BILIRUB UR QL STRIP: NEGATIVE
BUN SERPL-MCNC: 11 MG/DL (ref 8–23)
BUN/CREAT SERPL: 13.6 (ref 7–25)
CALCIUM SPEC-SCNC: 9.4 MG/DL (ref 8.6–10.5)
CHLORIDE SERPL-SCNC: 100 MMOL/L (ref 98–107)
CLARITY UR: CLEAR
CO2 SERPL-SCNC: 31 MMOL/L (ref 22–29)
COLOR UR: YELLOW
CREAT SERPL-MCNC: 0.81 MG/DL (ref 0.57–1)
DEPRECATED RDW RBC AUTO: 51.4 FL (ref 37–54)
EGFRCR SERPLBLD CKD-EPI 2021: 73.9 ML/MIN/1.73
EOSINOPHIL # BLD AUTO: 0.17 10*3/MM3 (ref 0–0.4)
EOSINOPHIL NFR BLD AUTO: 3.1 % (ref 0.3–6.2)
ERYTHROCYTE [DISTWIDTH] IN BLOOD BY AUTOMATED COUNT: 15.2 % (ref 12.3–15.4)
ETHANOL BLD-MCNC: <10 MG/DL (ref 0–10)
GEN 5 2HR TROPONIN T REFLEX: 9 NG/L
GLOBULIN UR ELPH-MCNC: 2.4 GM/DL
GLUCOSE SERPL-MCNC: 94 MG/DL (ref 65–99)
GLUCOSE UR STRIP-MCNC: NEGATIVE MG/DL
HCT VFR BLD AUTO: 40.4 % (ref 34–46.6)
HGB BLD-MCNC: 12.5 G/DL (ref 12–15.9)
HGB UR QL STRIP.AUTO: ABNORMAL
HOLD SPECIMEN: NORMAL
HYALINE CASTS UR QL AUTO: ABNORMAL /LPF
IMM GRANULOCYTES # BLD AUTO: 0.02 10*3/MM3 (ref 0–0.05)
IMM GRANULOCYTES NFR BLD AUTO: 0.4 % (ref 0–0.5)
KETONES UR QL STRIP: NEGATIVE
LEUKOCYTE ESTERASE UR QL STRIP.AUTO: ABNORMAL
LYMPHOCYTES # BLD AUTO: 1.82 10*3/MM3 (ref 0.7–3.1)
LYMPHOCYTES NFR BLD AUTO: 33.6 % (ref 19.6–45.3)
MCH RBC QN AUTO: 28.4 PG (ref 26.6–33)
MCHC RBC AUTO-ENTMCNC: 30.9 G/DL (ref 31.5–35.7)
MCV RBC AUTO: 91.8 FL (ref 79–97)
MONOCYTES # BLD AUTO: 0.64 10*3/MM3 (ref 0.1–0.9)
MONOCYTES NFR BLD AUTO: 11.8 % (ref 5–12)
NEUTROPHILS NFR BLD AUTO: 2.74 10*3/MM3 (ref 1.7–7)
NEUTROPHILS NFR BLD AUTO: 50.7 % (ref 42.7–76)
NITRITE UR QL STRIP: NEGATIVE
NRBC BLD AUTO-RTO: 0 /100 WBC (ref 0–0.2)
NT-PROBNP SERPL-MCNC: 800.6 PG/ML (ref 0–1800)
PH UR STRIP.AUTO: 6 [PH] (ref 5–8)
PLATELET # BLD AUTO: 276 10*3/MM3 (ref 140–450)
PMV BLD AUTO: 9.3 FL (ref 6–12)
POTASSIUM SERPL-SCNC: 4.5 MMOL/L (ref 3.5–5.2)
PROT SERPL-MCNC: 7 G/DL (ref 6–8.5)
PROT UR QL STRIP: NEGATIVE
QT INTERVAL: 474 MS
QTC INTERVAL: 423 MS
RBC # BLD AUTO: 4.4 10*6/MM3 (ref 3.77–5.28)
RBC # UR STRIP: ABNORMAL /HPF
REF LAB TEST METHOD: ABNORMAL
SODIUM SERPL-SCNC: 140 MMOL/L (ref 136–145)
SP GR UR STRIP: 1.01 (ref 1–1.03)
SQUAMOUS #/AREA URNS HPF: ABNORMAL /HPF
TROPONIN T DELTA: -3 NG/L
TROPONIN T SERPL HS-MCNC: 12 NG/L
TROPONIN T SERPL HS-MCNC: 13 NG/L
UROBILINOGEN UR QL STRIP: ABNORMAL
WBC # UR STRIP: ABNORMAL /HPF
WBC NRBC COR # BLD: 5.41 10*3/MM3 (ref 3.4–10.8)
WHOLE BLOOD HOLD COAG: NORMAL
WHOLE BLOOD HOLD SPECIMEN: NORMAL

## 2023-07-18 PROCEDURE — 96365 THER/PROPH/DIAG IV INF INIT: CPT

## 2023-07-18 PROCEDURE — 84484 ASSAY OF TROPONIN QUANT: CPT | Performed by: PHYSICIAN ASSISTANT

## 2023-07-18 PROCEDURE — 70548 MR ANGIOGRAPHY NECK W/DYE: CPT

## 2023-07-18 PROCEDURE — 83880 ASSAY OF NATRIURETIC PEPTIDE: CPT | Performed by: PHYSICIAN ASSISTANT

## 2023-07-18 PROCEDURE — 0 GADOBENATE DIMEGLUMINE 529 MG/ML SOLUTION: Performed by: EMERGENCY MEDICINE

## 2023-07-18 PROCEDURE — G0378 HOSPITAL OBSERVATION PER HR: HCPCS

## 2023-07-18 PROCEDURE — 96366 THER/PROPH/DIAG IV INF ADDON: CPT

## 2023-07-18 PROCEDURE — 70544 MR ANGIOGRAPHY HEAD W/O DYE: CPT

## 2023-07-18 PROCEDURE — 80053 COMPREHEN METABOLIC PANEL: CPT | Performed by: PHYSICIAN ASSISTANT

## 2023-07-18 PROCEDURE — 81001 URINALYSIS AUTO W/SCOPE: CPT | Performed by: PHYSICIAN ASSISTANT

## 2023-07-18 PROCEDURE — 71045 X-RAY EXAM CHEST 1 VIEW: CPT

## 2023-07-18 PROCEDURE — 93005 ELECTROCARDIOGRAM TRACING: CPT | Performed by: EMERGENCY MEDICINE

## 2023-07-18 PROCEDURE — 96375 TX/PRO/DX INJ NEW DRUG ADDON: CPT

## 2023-07-18 PROCEDURE — 70551 MRI BRAIN STEM W/O DYE: CPT

## 2023-07-18 PROCEDURE — 82077 ASSAY SPEC XCP UR&BREATH IA: CPT | Performed by: PHYSICIAN ASSISTANT

## 2023-07-18 PROCEDURE — 25010000002 THIAMINE PER 100 MG: Performed by: NURSE PRACTITIONER

## 2023-07-18 PROCEDURE — A9577 INJ MULTIHANCE: HCPCS | Performed by: EMERGENCY MEDICINE

## 2023-07-18 PROCEDURE — 99285 EMERGENCY DEPT VISIT HI MDM: CPT

## 2023-07-18 PROCEDURE — 85025 COMPLETE CBC W/AUTO DIFF WBC: CPT | Performed by: PHYSICIAN ASSISTANT

## 2023-07-18 PROCEDURE — 84484 ASSAY OF TROPONIN QUANT: CPT | Performed by: NURSE PRACTITIONER

## 2023-07-18 RX ORDER — SODIUM CHLORIDE 0.9 % (FLUSH) 0.9 %
10 SYRINGE (ML) INJECTION EVERY 12 HOURS SCHEDULED
Status: DISCONTINUED | OUTPATIENT
Start: 2023-07-18 | End: 2023-07-20 | Stop reason: HOSPADM

## 2023-07-18 RX ORDER — LORAZEPAM 1 MG/1
2 TABLET ORAL EVERY 6 HOURS
Status: DISCONTINUED | OUTPATIENT
Start: 2023-07-18 | End: 2023-07-19

## 2023-07-18 RX ORDER — LANOLIN ALCOHOL/MO/W.PET/CERES
1000 CREAM (GRAM) TOPICAL DAILY
Status: DISCONTINUED | OUTPATIENT
Start: 2023-07-18 | End: 2023-07-20 | Stop reason: HOSPADM

## 2023-07-18 RX ORDER — METOPROLOL TARTRATE 50 MG/1
50 TABLET, FILM COATED ORAL 2 TIMES DAILY
Status: DISCONTINUED | OUTPATIENT
Start: 2023-07-18 | End: 2023-07-19

## 2023-07-18 RX ORDER — LORAZEPAM 2 MG/ML
2 INJECTION INTRAMUSCULAR
Status: DISCONTINUED | OUTPATIENT
Start: 2023-07-18 | End: 2023-07-20 | Stop reason: HOSPADM

## 2023-07-18 RX ORDER — FOLIC ACID 1 MG/1
1 TABLET ORAL DAILY
Status: DISCONTINUED | OUTPATIENT
Start: 2023-07-18 | End: 2023-07-20 | Stop reason: HOSPADM

## 2023-07-18 RX ORDER — SODIUM CHLORIDE 0.9 % (FLUSH) 0.9 %
10 SYRINGE (ML) INJECTION AS NEEDED
Status: DISCONTINUED | OUTPATIENT
Start: 2023-07-18 | End: 2023-07-18 | Stop reason: SDUPTHER

## 2023-07-18 RX ORDER — LOSARTAN POTASSIUM 50 MG/1
100 TABLET ORAL DAILY
Status: DISCONTINUED | OUTPATIENT
Start: 2023-07-19 | End: 2023-07-20 | Stop reason: HOSPADM

## 2023-07-18 RX ORDER — LORAZEPAM 2 MG/ML
1 INJECTION INTRAMUSCULAR
Status: DISCONTINUED | OUTPATIENT
Start: 2023-07-18 | End: 2023-07-20 | Stop reason: HOSPADM

## 2023-07-18 RX ORDER — BISACODYL 10 MG
10 SUPPOSITORY, RECTAL RECTAL DAILY PRN
Status: DISCONTINUED | OUTPATIENT
Start: 2023-07-18 | End: 2023-07-20 | Stop reason: HOSPADM

## 2023-07-18 RX ORDER — ONDANSETRON 2 MG/ML
4 INJECTION INTRAMUSCULAR; INTRAVENOUS EVERY 6 HOURS PRN
Status: DISCONTINUED | OUTPATIENT
Start: 2023-07-18 | End: 2023-07-20 | Stop reason: HOSPADM

## 2023-07-18 RX ORDER — LORAZEPAM 1 MG/1
2 TABLET ORAL
Status: DISCONTINUED | OUTPATIENT
Start: 2023-07-18 | End: 2023-07-20 | Stop reason: HOSPADM

## 2023-07-18 RX ORDER — THIAMINE HYDROCHLORIDE 100 MG/ML
200 INJECTION, SOLUTION INTRAMUSCULAR; INTRAVENOUS EVERY 8 HOURS SCHEDULED
Status: DISCONTINUED | OUTPATIENT
Start: 2023-07-18 | End: 2023-07-20 | Stop reason: HOSPADM

## 2023-07-18 RX ORDER — SODIUM CHLORIDE 9 MG/ML
40 INJECTION, SOLUTION INTRAVENOUS AS NEEDED
Status: DISCONTINUED | OUTPATIENT
Start: 2023-07-18 | End: 2023-07-20 | Stop reason: HOSPADM

## 2023-07-18 RX ORDER — BISACODYL 5 MG/1
5 TABLET, DELAYED RELEASE ORAL DAILY PRN
Status: DISCONTINUED | OUTPATIENT
Start: 2023-07-18 | End: 2023-07-20 | Stop reason: HOSPADM

## 2023-07-18 RX ORDER — POLYETHYLENE GLYCOL 3350 17 G/17G
17 POWDER, FOR SOLUTION ORAL DAILY PRN
Status: DISCONTINUED | OUTPATIENT
Start: 2023-07-18 | End: 2023-07-20 | Stop reason: HOSPADM

## 2023-07-18 RX ORDER — MULTIPLE VITAMINS W/ MINERALS TAB 9MG-400MCG
1 TAB ORAL DAILY
Status: DISCONTINUED | OUTPATIENT
Start: 2023-07-18 | End: 2023-07-20 | Stop reason: HOSPADM

## 2023-07-18 RX ORDER — CHOLECALCIFEROL (VITAMIN D3) 125 MCG
5 CAPSULE ORAL NIGHTLY PRN
Status: DISCONTINUED | OUTPATIENT
Start: 2023-07-18 | End: 2023-07-20 | Stop reason: HOSPADM

## 2023-07-18 RX ORDER — AMOXICILLIN 250 MG
2 CAPSULE ORAL 2 TIMES DAILY
Status: DISCONTINUED | OUTPATIENT
Start: 2023-07-18 | End: 2023-07-20 | Stop reason: HOSPADM

## 2023-07-18 RX ORDER — SODIUM CHLORIDE 0.9 % (FLUSH) 0.9 %
10 SYRINGE (ML) INJECTION AS NEEDED
Status: DISCONTINUED | OUTPATIENT
Start: 2023-07-18 | End: 2023-07-20 | Stop reason: HOSPADM

## 2023-07-18 RX ORDER — ACETAMINOPHEN 325 MG/1
650 TABLET ORAL EVERY 4 HOURS PRN
Status: DISCONTINUED | OUTPATIENT
Start: 2023-07-18 | End: 2023-07-20 | Stop reason: HOSPADM

## 2023-07-18 RX ORDER — AMIODARONE HYDROCHLORIDE 200 MG/1
100 TABLET ORAL DAILY
Status: DISCONTINUED | OUTPATIENT
Start: 2023-07-19 | End: 2023-07-19

## 2023-07-18 RX ORDER — LORAZEPAM 1 MG/1
1 TABLET ORAL
Status: DISCONTINUED | OUTPATIENT
Start: 2023-07-18 | End: 2023-07-20 | Stop reason: HOSPADM

## 2023-07-18 RX ORDER — CLONIDINE HYDROCHLORIDE 0.1 MG/1
0.1 TABLET ORAL EVERY 4 HOURS PRN
Status: DISCONTINUED | OUTPATIENT
Start: 2023-07-18 | End: 2023-07-20 | Stop reason: HOSPADM

## 2023-07-18 RX ORDER — HYDROCHLOROTHIAZIDE 25 MG/1
25 TABLET ORAL DAILY
COMMUNITY
End: 2023-07-20 | Stop reason: HOSPADM

## 2023-07-18 RX ORDER — ACETAMINOPHEN 500 MG
1000 TABLET ORAL ONCE
Status: COMPLETED | OUTPATIENT
Start: 2023-07-18 | End: 2023-07-18

## 2023-07-18 RX ORDER — CETIRIZINE HYDROCHLORIDE 10 MG/1
5 TABLET ORAL DAILY
Status: DISCONTINUED | OUTPATIENT
Start: 2023-07-18 | End: 2023-07-20 | Stop reason: HOSPADM

## 2023-07-18 RX ORDER — AMLODIPINE BESYLATE 5 MG/1
5 TABLET ORAL NIGHTLY
Status: DISCONTINUED | OUTPATIENT
Start: 2023-07-18 | End: 2023-07-19

## 2023-07-18 RX ORDER — LORAZEPAM 1 MG/1
1 TABLET ORAL EVERY 6 HOURS
Status: DISCONTINUED | OUTPATIENT
Start: 2023-07-19 | End: 2023-07-19

## 2023-07-18 RX ORDER — TRAZODONE HYDROCHLORIDE 100 MG/1
100 TABLET ORAL NIGHTLY
Status: DISCONTINUED | OUTPATIENT
Start: 2023-07-18 | End: 2023-07-20 | Stop reason: HOSPADM

## 2023-07-18 RX ADMIN — MULTIPLE VITAMINS W/ MINERALS TAB 1 TABLET: TAB at 20:33

## 2023-07-18 RX ADMIN — AMLODIPINE BESYLATE 5 MG: 5 TABLET ORAL at 20:33

## 2023-07-18 RX ADMIN — FOLIC ACID 1 MG: 1 TABLET ORAL at 20:34

## 2023-07-18 RX ADMIN — RIVAROXABAN 20 MG: 20 TABLET, FILM COATED ORAL at 20:33

## 2023-07-18 RX ADMIN — CYANOCOBALAMIN TAB 1000 MCG 1000 MCG: 1000 TAB at 20:33

## 2023-07-18 RX ADMIN — TRAZODONE HYDROCHLORIDE 100 MG: 100 TABLET ORAL at 20:34

## 2023-07-18 RX ADMIN — LORAZEPAM 2 MG: 1 TABLET ORAL at 20:33

## 2023-07-18 RX ADMIN — THIAMINE HYDROCHLORIDE 200 MG: 100 INJECTION, SOLUTION INTRAMUSCULAR; INTRAVENOUS at 20:33

## 2023-07-18 RX ADMIN — ACETAMINOPHEN 1000 MG: 500 TABLET ORAL at 16:45

## 2023-07-18 RX ADMIN — CETIRIZINE HYDROCHLORIDE 5 MG: 10 TABLET, FILM COATED ORAL at 20:34

## 2023-07-18 RX ADMIN — GADOBENATE DIMEGLUMINE 13 ML: 529 INJECTION, SOLUTION INTRAVENOUS at 16:14

## 2023-07-18 RX ADMIN — SENNOSIDES AND DOCUSATE SODIUM 2 TABLET: 50; 8.6 TABLET ORAL at 20:33

## 2023-07-18 RX ADMIN — NICARDIPINE HYDROCHLORIDE 5 MG/HR: 25 INJECTION, SOLUTION INTRAVENOUS at 11:50

## 2023-07-18 NOTE — H&P
[facility] Medicine Services  HISTORY AND PHYSICAL    Patient Name: Catrachita Brunson  : 1945  MRN: [id number]  Primary Care Physician: Maricel Cartagena DO  Date of admission: 2023      Subjective   Subjective     Chief Complaint:  Dizziness, falls, Hypertension    HPI:  Catrachita Brunson is a 78 y.o. female with pmh significant for HTN, PAF on xarelto, alcohol abuse presents from PCP office with resistant HTN, dizziness, headaches and falls. Patient reports taking home metoprolol and losartan daily but continues to have high BP and trouble with gait due to dizziness. She has sustained a few non injury falls. Patient reports drinking ~ 4 beers nightly depending on mood, no recent illness, difficulty eating or voiding, soa or CP. Patient noted to have BP 200s/100 on arrival and placed on cardene gtt.       Review of Systems   Constitutional:  Positive for activity change.   Eyes: Negative.    Respiratory: Negative.     Cardiovascular: Negative.    Gastrointestinal: Negative.    Genitourinary: Negative.    Musculoskeletal:  Positive for gait problem.   Neurological:  Positive for dizziness and headaches.   Psychiatric/Behavioral:  Positive for confusion.         Personal History     Past Medical History:   Diagnosis Date    Arrhythmia     Goiter     Hypertension     PAF (paroxysmal atrial fibrillation)              Past Surgical History:   Procedure Laterality Date    CARPAL TUNNEL RELEASE      FOOT SURGERY Bilateral     GASTRIC BYPASS      HYSTERECTOMY      JOINT REPLACEMENT      both knees, both shoulders    OOPHORECTOMY      SPINE SURGERY      cervicla disk replacement    THYROID SURGERY         Family History: family history includes Breast cancer in her mother and sister; Cancer in her father, mother, and sister.     Social History:  reports that she has never smoked. She has never used smokeless tobacco. She reports current alcohol use of about 20.0 standard drinks per week. She  reports that she does not use drugs.  Social History     Social History Narrative    Not on file       Medications:  Available home medication information reviewed.  (Not in a hospital admission)      Allergies   Allergen Reactions    Penicillins Anaphylaxis       Objective   Objective     Vital Signs:   Temp:  [98.5 °F (36.9 °C)-98.6 °F (37 °C)] 98.5 °F (36.9 °C)  Heart Rate:  [48-58] 48  Resp:  [14-16] 16  BP: (144-223)/() 176/89       Physical Exam   Constitutional: No acute distress, awake, alert  HENT: NCAT, mucous membranes moist  Respiratory: Clear to auscultation bilaterally, respiratory effort normal   Cardiovascular: RRR, no murmurs, rubs, or gallops  Gastrointestinal: Positive bowel sounds, soft, nontender, nondistended  Musculoskeletal: No bilateral ankle edema  Psychiatric: Appropriate affect, cooperative  Neurologic: Oriented x 3, strength symmetric in all extremities, Cranial Nerves grossly intact to confrontation, speech clear  Skin: No rashes      Result Review:  I have personally reviewed the results from the time of this admission to 7/18/2023 17:46 EDT and agree with these findings:  [x]  Laboratory list / accordion  []  Microbiology  [x]  Radiology  [x]  EKG/Telemetry   []  Cardiology/Vascular   []  Pathology  []  Old records  []  Other:  Most notable findings include:         LAB RESULTS:      Lab 07/18/23  1119   WBC 5.41   HEMOGLOBIN 12.5   HEMATOCRIT 40.4   PLATELETS 276   NEUTROS ABS 2.74   IMMATURE GRANS (ABS) 0.02   LYMPHS ABS 1.82   MONOS ABS 0.64   EOS ABS 0.17   MCV 91.8         Lab 07/18/23  1119   SODIUM 140   POTASSIUM 4.5   CHLORIDE 100   CO2 31.0*   ANION GAP 9.0   BUN 11   CREATININE 0.81   EGFR 73.9   GLUCOSE 94   CALCIUM 9.4         Lab 07/18/23  1119   TOTAL PROTEIN 7.0   ALBUMIN 4.6   GLOBULIN 2.4   ALT (SGPT) 11   AST (SGOT) 21   BILIRUBIN 0.5   ALK PHOS 60         Lab 07/18/23  1119   PROBNP 800.6   HSTROP T 13*                 UA          7/18/2023    11:45    Urinalysis   Squamous Epithelial Cells, UA 3-6    Specific Gravity, UA 1.008    Ketones, UA Negative    Blood, UA Small (1+)    Leukocytes, UA Small (1+)    Nitrite, UA Negative    RBC, UA 3-6    WBC, UA 6-12    Bacteria, UA Trace        Microbiology Results (last 10 days)       ** No results found for the last 240 hours. **            MRI Angiogram Head Without Contrast    Result Date: 7/18/2023  MRI ANGIOGRAM HEAD WO CONTRAST, MRI BRAIN WO CONTRAST, MRI ANGIOGRAM NECK W CONTRAST Date of Exam: 7/18/2023 3:11 PM EDT Indication: dizziness, headache.  Comparison: None available. Technique: Multiplanar multisequence MRI of the brain performed without IV contrast. Noncontrast time-of-flight 3-dimensional MR angiogram of the head with three-dimensional maximum intensity projections postprocessed. IV contrast enhanced MR angiogram of the neck was also performed. Findings: MRI brain: No acute infarct is present on diffusion weighted sequences. Midline structures are normal and the craniocervical junction appears satisfactory. Age-related changes are present, with mild volume loss and fairly prominent periventricular leukomalacia, likely reflecting chronic small vessel ischemic change. There is otherwise no evidence of intracranial hemorrhage, mass or mass effect. The ventricles are normal in size and configuration. The orbits are normal. The paranasal sinuses are grossly clear. MR angiogram head: The carotid siphons demonstrate no significant atherosclerotic narrowing. The anterior cerebral arteries are normal in course and caliber bilaterally. The right middle cerebral artery demonstrates no evidence of flow-limiting stenosis,  large vessel occlusion or aneurysm. The left middle cerebral artery is similarly normal in course and caliber. The vertebrobasilar system is patent. The posterior cerebral arteries are normal in course and caliber bilaterally. MR angiogram neck: The aortic arch demonstrates a typical three-vessel  branching. On the right, there is no significant atherosclerotic narrowing of the ICA origin, 0% by NASCET criteria. On the left, there is no evidence of high-grade atherosclerotic narrowing, 0% by NASCET criteria, however there is questionable linear luminal irregularity, with the appearance of possible carotid web or short segment dissection. The remaining left ICA is normal. The vertebral arteries are normal in course and caliber bilaterally.     Impression: Impression: MRI of the brain demonstrates no evidence of acute infarct. Relatively advanced chronic changes are present, including prominent bilateral periventricular leukomalacia, most consistent with chronic small vessel ischemic change. There is otherwise no evidence of hemorrhage, mass or mass effect. MR angiogram of the neck demonstrates a focal luminal irregularity, appearing somewhat linear involving the posterior wall of the left ICA origin. There is no evidence of associated luminal narrowing, however finding raises concern for either a carotid web or short segment dissection. CT angiogram could be useful to further evaluate. MR angiogram of the head and neck is otherwise essentially normal, without evidence of flow-limiting stenosis, large vessel occlusion or aneurysm. Electronically Signed: Bakari Phan  7/18/2023 4:23 PM EDT  Workstation ID: [id number]    MRI Angiogram Neck With Contrast    Result Date: 7/18/2023  MRI ANGIOGRAM HEAD WO CONTRAST, MRI BRAIN WO CONTRAST, MRI ANGIOGRAM NECK W CONTRAST Date of Exam: 7/18/2023 3:11 PM EDT Indication: dizziness, headache.  Comparison: None available. Technique: Multiplanar multisequence MRI of the brain performed without IV contrast. Noncontrast time-of-flight 3-dimensional MR angiogram of the head with three-dimensional maximum intensity projections postprocessed. IV contrast enhanced MR angiogram of the neck was also performed. Findings: MRI brain: No acute infarct is present on diffusion weighted  sequences. Midline structures are normal and the craniocervical junction appears satisfactory. Age-related changes are present, with mild volume loss and fairly prominent periventricular leukomalacia, likely reflecting chronic small vessel ischemic change. There is otherwise no evidence of intracranial hemorrhage, mass or mass effect. The ventricles are normal in size and configuration. The orbits are normal. The paranasal sinuses are grossly clear. MR angiogram head: The carotid siphons demonstrate no significant atherosclerotic narrowing. The anterior cerebral arteries are normal in course and caliber bilaterally. The right middle cerebral artery demonstrates no evidence of flow-limiting stenosis,  large vessel occlusion or aneurysm. The left middle cerebral artery is similarly normal in course and caliber. The vertebrobasilar system is patent. The posterior cerebral arteries are normal in course and caliber bilaterally. MR angiogram neck: The aortic arch demonstrates a typical three-vessel branching. On the right, there is no significant atherosclerotic narrowing of the ICA origin, 0% by NASCET criteria. On the left, there is no evidence of high-grade atherosclerotic narrowing, 0% by NASCET criteria, however there is questionable linear luminal irregularity, with the appearance of possible carotid web or short segment dissection. The remaining left ICA is normal. The vertebral arteries are normal in course and caliber bilaterally.     Impression: Impression: MRI of the brain demonstrates no evidence of acute infarct. Relatively advanced chronic changes are present, including prominent bilateral periventricular leukomalacia, most consistent with chronic small vessel ischemic change. There is otherwise no evidence of hemorrhage, mass or mass effect. MR angiogram of the neck demonstrates a focal luminal irregularity, appearing somewhat linear involving the posterior wall of the left ICA origin. There is no evidence  of associated luminal narrowing, however finding raises concern for either a carotid web or short segment dissection. CT angiogram could be useful to further evaluate. MR angiogram of the head and neck is otherwise essentially normal, without evidence of flow-limiting stenosis, large vessel occlusion or aneurysm. Electronically Signed: Bakari Phan  7/18/2023 4:23 PM EDT  Workstation ID: [id number]    MRI Brain Without Contrast    Result Date: 7/18/2023  MRI ANGIOGRAM HEAD WO CONTRAST, MRI BRAIN WO CONTRAST, MRI ANGIOGRAM NECK W CONTRAST Date of Exam: 7/18/2023 3:11 PM EDT Indication: dizziness, headache.  Comparison: None available. Technique: Multiplanar multisequence MRI of the brain performed without IV contrast. Noncontrast time-of-flight 3-dimensional MR angiogram of the head with three-dimensional maximum intensity projections postprocessed. IV contrast enhanced MR angiogram of the neck was also performed. Findings: MRI brain: No acute infarct is present on diffusion weighted sequences. Midline structures are normal and the craniocervical junction appears satisfactory. Age-related changes are present, with mild volume loss and fairly prominent periventricular leukomalacia, likely reflecting chronic small vessel ischemic change. There is otherwise no evidence of intracranial hemorrhage, mass or mass effect. The ventricles are normal in size and configuration. The orbits are normal. The paranasal sinuses are grossly clear. MR angiogram head: The carotid siphons demonstrate no significant atherosclerotic narrowing. The anterior cerebral arteries are normal in course and caliber bilaterally. The right middle cerebral artery demonstrates no evidence of flow-limiting stenosis,  large vessel occlusion or aneurysm. The left middle cerebral artery is similarly normal in course and caliber. The vertebrobasilar system is patent. The posterior cerebral arteries are normal in course and caliber bilaterally. MR angiogram  neck: The aortic arch demonstrates a typical three-vessel branching. On the right, there is no significant atherosclerotic narrowing of the ICA origin, 0% by NASCET criteria. On the left, there is no evidence of high-grade atherosclerotic narrowing, 0% by NASCET criteria, however there is questionable linear luminal irregularity, with the appearance of possible carotid web or short segment dissection. The remaining left ICA is normal. The vertebral arteries are normal in course and caliber bilaterally.     Impression: Impression: MRI of the brain demonstrates no evidence of acute infarct. Relatively advanced chronic changes are present, including prominent bilateral periventricular leukomalacia, most consistent with chronic small vessel ischemic change. There is otherwise no evidence of hemorrhage, mass or mass effect. MR angiogram of the neck demonstrates a focal luminal irregularity, appearing somewhat linear involving the posterior wall of the left ICA origin. There is no evidence of associated luminal narrowing, however finding raises concern for either a carotid web or short segment dissection. CT angiogram could be useful to further evaluate. MR angiogram of the head and neck is otherwise essentially normal, without evidence of flow-limiting stenosis, large vessel occlusion or aneurysm. Electronically Signed: Bakari Phan  7/18/2023 4:23 PM EDT  Workstation ID: [id number]    XR Chest 1 View    Result Date: 7/18/2023  XR CHEST 1 VW Date of Exam: 7/18/2023 11:25 AM EDT Indication: uncontrolled HTN, generalized weakness Comparison: None available. Findings: There is mild cardiomegaly. There is pulmonary vascular congestion. Lung fields are well inflated. There is no focal consolidating infiltrate and there is no effusion.     Impression: Impression: Findings suggest mild CHF. Follow-up exam may be useful. Electronically Signed: Puja Coronado MD  7/18/2023 11:37 AM EDT  Workstation ID: [id number]     Results for  orders placed during the hospital encounter of 01/27/23    Adult Transthoracic Echo Complete w/ Color, Spectral and Contrast if necessary per protocol    Interpretation Summary    Left ventricular systolic function is hyperdynamic (EF > 70%).    Left ventricular wall thickness is consistent with hypertrophy. Sigmoid-shaped ventricular septum is present.    Left ventricular diastolic function is consistent with (grade II w/high LAP) pseudonormalization.    Left atrial volume is moderately increased.    The aortic valve is abnormal in structure. There is mild calcification of the aortic valve. The aortic valve appears trileaflet. Mild aortic valve regurgitation is present. No hemodynamically significant aortic valve stenosis is present.    Estimated right ventricular systolic pressure from tricuspid regurgitation is normal (<35 mmHg).    Mild dilation of the ascending aorta is present.    The right atrial cavity is dilated.    The right atrial cavity is dilated. The inferior vena cava is normally sized. The diameter of the inferior vena cava is 1.3 cm. Normal IVC inspiratory collapse of greater than 50% noted.      Assessment & Plan   Assessment & Plan     Active Hospital Problems    Diagnosis  POA    **Hypertensive urgency [I16.0]  Yes    Dizziness [R42]  Unknown    Falls [W19.XXXA]  Unknown    Alcohol abuse [F10.10]  Unknown    Elevated troponin [R77.8]  Unknown    Paroxysmal atrial fibrillation [I48.0]  Yes    Systolic heart failure [I50.20]  Yes    Bradycardia, sinus [R00.1]  Yes    Primary hypertension [I10]  Yes     Hypertensive Urgency, symptomatic  -- initial BP 200s/100, has taken metoprolol, losartan today. Started on cardene gtt in ED  -- cardene stopped for imaging, has been held for continued -130s  -- continue home metoprolol, losartan, add amlodipine tonight  -- PRN for SBP >180    Dizziness/ Headaches  Falls  -- MRI brain negative for infarct. Advanced chronic changes. MRA head and neck: focal  "luminal irregularity of left ICA- recommend CTA neck follow up- ordered  -- tylenol prn  -- PT/OT eval  -- monitor on tele    Alcohol abuse  -- ethanol <10, reports near daily use of at least 4 beers, but depends how \"drunk she wants to get\"  -- [facility] protocol  -- MV, thiamine, folic acid    PAF  Bradycardia   -- continue metoprolol with hold parameters, amiodarone, xarelto    Elevated troponin  -- repeat pending. EKG reviewed. No chest pain, ischemic changes    DVT prophylaxis:  xarelto      CODE STATUS:    Code Status and Medical Interventions:   Ordered at: 07/18/23 1731     Medical Intervention Limits:    NO intubation (DNI)    NO cardioversion     Code Status (Patient has no pulse and is not breathing):    No CPR (Do Not Attempt to Resuscitate)     Medical Interventions (Patient has pulse or is breathing):    Limited Support     Release to patient:    Routine Release       Expected Discharge   Expected Discharge Date: 7/20/2023; Expected Discharge Time:      DANNIELLE Hunt  07/18/23  Electronically signed by DANNIELLE Hunt, 07/18/23, 5:36 PM EDT.    " complains of pain/discomfort

## 2023-07-18 NOTE — ED PROVIDER NOTES
Subjective   History of Present Illness  Ms. Brunson is a 78-year-old female with a history of atrial fibrillation (on Xarelto), hypertension, chronic bilateral shoulder pain secondary to joint replacement, who was sent to the emergency department for further evaluation and management of hypertension.  The patient states that she has been having near daily headaches for the past 1 and half months.  She has also been quite dizzy.  She has had several falls over the past couple of months, one resulting in a laceration to the scalp that required stapling.  She has also had intermittent chest pains off and on and some mild shortness of breath.  The patient was seen by her PCP 5 days ago for uncontrolled hypertension and had hydrochlorothiazide added to her current regimen but the patient has not gotten it filled as she was concerned about potential side effects.  The patient is a non-smoker.  She drinks 3-4 beers daily.  No drug use.    Review of Systems   Constitutional:  Negative for chills and fever.   HENT:  Negative for sore throat.    Eyes:  Negative for visual disturbance.   Respiratory:  Negative for cough and shortness of breath.    Cardiovascular:  Positive for chest pain (Intermittent). Negative for palpitations.   Gastrointestinal:  Negative for abdominal pain, nausea and vomiting.   Genitourinary:  Negative for dysuria.   Musculoskeletal:  Negative for neck pain and neck stiffness.   Skin:  Negative for rash.   Allergic/Immunologic: Negative for immunocompromised state.   Neurological:  Positive for dizziness, light-headedness and headaches. Negative for seizures, speech difficulty and numbness.   Hematological: Negative.    Psychiatric/Behavioral:  Positive for confusion.      Past Medical History:   Diagnosis Date    Arrhythmia     Goiter     Hypertension        Allergies   Allergen Reactions    Penicillins Anaphylaxis       Past Surgical History:   Procedure Laterality Date    CARPAL TUNNEL RELEASE       FOOT SURGERY Bilateral     GASTRIC BYPASS      HYSTERECTOMY      JOINT REPLACEMENT      both knees, both shoulders    OOPHORECTOMY      SPINE SURGERY      cervicla disk replacement    THYROID SURGERY         Family History   Problem Relation Age of Onset    Breast cancer Mother     Cancer Mother     Cancer Father     Breast cancer Sister     Cancer Sister     Ovarian cancer Neg Hx        Social History     Socioeconomic History    Marital status: Single   Tobacco Use    Smoking status: Never    Smokeless tobacco: Never   Vaping Use    Vaping Use: Never used   Substance and Sexual Activity    Alcohol use: Yes     Alcohol/week: 7.0 standard drinks     Types: 7 Cans of beer per week    Drug use: Never    Sexual activity: Defer           Objective   Physical Exam  Constitutional:       General: She is not in acute distress.     Appearance: Normal appearance. She is not diaphoretic.   HENT:      Head: Normocephalic.      Nose: Nose normal.      Mouth/Throat:      Mouth: Mucous membranes are moist.   Eyes:      Pupils: Pupils are equal, round, and reactive to light.      Comments: Mild disconjugate gaze   Neck:      Vascular: No carotid bruit.   Cardiovascular:      Rate and Rhythm: Regular rhythm. Bradycardia present.      Comments: Bradycardic with a rate in the 50s  Pulmonary:      Effort: Pulmonary effort is normal.      Breath sounds: Normal breath sounds.   Abdominal:      General: Bowel sounds are normal.      Tenderness: There is no abdominal tenderness. There is no guarding.   Musculoskeletal:         General: No tenderness.      Cervical back: Normal range of motion and neck supple. No rigidity or tenderness.      Right lower leg: No edema.      Left lower leg: No edema.   Skin:     General: Skin is warm and dry.      Coloration: Skin is not jaundiced or pale.      Findings: No rash.   Neurological:      General: No focal deficit present.      Mental Status: She is alert.      Comments: Patient is alert but  "mildly confused.  She states that she cannot recall the name of her doctor.  She states that she had a fall a couple of months ago that resulted in having to have staples placed in her scalp but she cannot recall which hospital she went to.  She states that she is \"forgetful\".  She complains of near continuous dizziness that is made worse on standing or moving about.   Psychiatric:         Mood and Affect: Mood normal.       Procedures           ED Course      In summary, 78-year-old female with a history of atrial fibrillation (on Xarelto), hypertension and some chronic bilateral shoulder pain, presents at the recommendation of her PCP for uncontrolled hypertension.  The patient states that she has been on metoprolol for quite some time and had hydrochlorothiazide added to her regimen last week but she did not start taking it as she was concerned about possible side effects.  The patient states that she has near continuous dizziness.  She has had several stumbles and a recent fall that resulted in staples being placed in her scalp.  She has some forgetfulness and daily headaches.    MDM: Differential includes hypertensive urgency, CVA, head bleed, malignancy, electrolyte abnormality, bradycardia, etc.    Patient started on Cardene drip.  Labs obtained.  Patient sent for MRI head, MRA head, MRA neck.                                         Medical Decision Making  Amount and/or Complexity of Data Reviewed  Labs: ordered.  Radiology: ordered.  ECG/medicine tests: ordered.    Risk  OTC drugs.  Prescription drug management.        Final diagnoses:   Hypertensive urgency   New daily persistent headache   Dizziness       ED Disposition  ED Disposition       ED Disposition   Decision to Admit    Condition   --    Comment   --               No follow-up provider specified.       Medication List        ASK your doctor about these medications      rivaroxaban 20 MG tablet  Commonly known as: XARELTO  Ask about: Which " instructions should I use?                 Moshe Willett, PA  07/18/23 8991

## 2023-07-18 NOTE — Clinical Note
Level of Care: Telemetry [5]   Diagnosis: Hypertensive urgency [389371]   Admitting Physician: ALMA WATKINS [[id number]]   Attending Physician: ALMA WATKINS [[id number]]

## 2023-07-19 ENCOUNTER — APPOINTMENT (OUTPATIENT)
Dept: CT IMAGING | Facility: HOSPITAL | Age: 78
End: 2023-07-19
Payer: MEDICARE

## 2023-07-19 LAB
ANION GAP SERPL CALCULATED.3IONS-SCNC: 7 MMOL/L (ref 5–15)
BUN SERPL-MCNC: 12 MG/DL (ref 8–23)
BUN/CREAT SERPL: 15.2 (ref 7–25)
CALCIUM SPEC-SCNC: 8.7 MG/DL (ref 8.6–10.5)
CHLORIDE SERPL-SCNC: 102 MMOL/L (ref 98–107)
CO2 SERPL-SCNC: 31 MMOL/L (ref 22–29)
CREAT SERPL-MCNC: 0.79 MG/DL (ref 0.57–1)
DEPRECATED RDW RBC AUTO: 49.5 FL (ref 37–54)
EGFRCR SERPLBLD CKD-EPI 2021: 76.2 ML/MIN/1.73
ERYTHROCYTE [DISTWIDTH] IN BLOOD BY AUTOMATED COUNT: 15 % (ref 12.3–15.4)
GLUCOSE SERPL-MCNC: 90 MG/DL (ref 65–99)
HCT VFR BLD AUTO: 36.1 % (ref 34–46.6)
HGB BLD-MCNC: 11.4 G/DL (ref 12–15.9)
MAGNESIUM SERPL-MCNC: 2.1 MG/DL (ref 1.6–2.4)
MCH RBC QN AUTO: 28.4 PG (ref 26.6–33)
MCHC RBC AUTO-ENTMCNC: 31.6 G/DL (ref 31.5–35.7)
MCV RBC AUTO: 90 FL (ref 79–97)
PLATELET # BLD AUTO: 221 10*3/MM3 (ref 140–450)
PMV BLD AUTO: 9.2 FL (ref 6–12)
POTASSIUM SERPL-SCNC: 3.7 MMOL/L (ref 3.5–5.2)
RBC # BLD AUTO: 4.01 10*6/MM3 (ref 3.77–5.28)
SODIUM SERPL-SCNC: 140 MMOL/L (ref 136–145)
TSH SERPL DL<=0.05 MIU/L-ACNC: 2.03 UIU/ML (ref 0.27–4.2)
WBC NRBC COR # BLD: 5.56 10*3/MM3 (ref 3.4–10.8)

## 2023-07-19 PROCEDURE — G0378 HOSPITAL OBSERVATION PER HR: HCPCS

## 2023-07-19 PROCEDURE — 84443 ASSAY THYROID STIM HORMONE: CPT | Performed by: NURSE PRACTITIONER

## 2023-07-19 PROCEDURE — 85027 COMPLETE CBC AUTOMATED: CPT | Performed by: NURSE PRACTITIONER

## 2023-07-19 PROCEDURE — 80048 BASIC METABOLIC PNL TOTAL CA: CPT | Performed by: NURSE PRACTITIONER

## 2023-07-19 PROCEDURE — 83735 ASSAY OF MAGNESIUM: CPT | Performed by: NURSE PRACTITIONER

## 2023-07-19 PROCEDURE — 25010000002 THIAMINE PER 100 MG: Performed by: NURSE PRACTITIONER

## 2023-07-19 PROCEDURE — 97161 PT EVAL LOW COMPLEX 20 MIN: CPT

## 2023-07-19 PROCEDURE — 70498 CT ANGIOGRAPHY NECK: CPT

## 2023-07-19 PROCEDURE — 96376 TX/PRO/DX INJ SAME DRUG ADON: CPT

## 2023-07-19 PROCEDURE — 97165 OT EVAL LOW COMPLEX 30 MIN: CPT

## 2023-07-19 PROCEDURE — 25510000001 IOPAMIDOL PER 1 ML: Performed by: INTERNAL MEDICINE

## 2023-07-19 RX ORDER — AMIODARONE HYDROCHLORIDE 200 MG/1
100 TABLET ORAL
Status: DISCONTINUED | OUTPATIENT
Start: 2023-07-20 | End: 2023-07-20 | Stop reason: HOSPADM

## 2023-07-19 RX ORDER — DIPHENOXYLATE HYDROCHLORIDE AND ATROPINE SULFATE 2.5; .025 MG/1; MG/1
1 TABLET ORAL DAILY
Status: DISCONTINUED | OUTPATIENT
Start: 2023-07-19 | End: 2023-07-19

## 2023-07-19 RX ORDER — PANTOPRAZOLE SODIUM 40 MG/1
40 TABLET, DELAYED RELEASE ORAL
Status: DISCONTINUED | OUTPATIENT
Start: 2023-07-19 | End: 2023-07-20 | Stop reason: HOSPADM

## 2023-07-19 RX ORDER — MECLIZINE HCL 12.5 MG/1
25 TABLET ORAL EVERY 8 HOURS SCHEDULED
Status: DISCONTINUED | OUTPATIENT
Start: 2023-07-19 | End: 2023-07-20 | Stop reason: HOSPADM

## 2023-07-19 RX ORDER — ASPIRIN 81 MG/1
81 TABLET, CHEWABLE ORAL DAILY
Status: DISCONTINUED | OUTPATIENT
Start: 2023-07-19 | End: 2023-07-20 | Stop reason: HOSPADM

## 2023-07-19 RX ORDER — ATORVASTATIN CALCIUM 40 MG/1
40 TABLET, FILM COATED ORAL NIGHTLY
Status: DISCONTINUED | OUTPATIENT
Start: 2023-07-19 | End: 2023-07-20 | Stop reason: HOSPADM

## 2023-07-19 RX ADMIN — FOLIC ACID 1 MG: 1 TABLET ORAL at 08:47

## 2023-07-19 RX ADMIN — ATORVASTATIN CALCIUM 40 MG: 40 TABLET, FILM COATED ORAL at 20:24

## 2023-07-19 RX ADMIN — DICLOFENAC SODIUM 2 G: 10 GEL TOPICAL at 17:11

## 2023-07-19 RX ADMIN — DICLOFENAC SODIUM 2 G: 10 GEL TOPICAL at 20:25

## 2023-07-19 RX ADMIN — THIAMINE HYDROCHLORIDE 200 MG: 100 INJECTION, SOLUTION INTRAMUSCULAR; INTRAVENOUS at 14:25

## 2023-07-19 RX ADMIN — IOPAMIDOL 75 ML: 755 INJECTION, SOLUTION INTRAVENOUS at 12:32

## 2023-07-19 RX ADMIN — RIVAROXABAN 20 MG: 20 TABLET, FILM COATED ORAL at 17:11

## 2023-07-19 RX ADMIN — THIAMINE HYDROCHLORIDE 200 MG: 100 INJECTION, SOLUTION INTRAMUSCULAR; INTRAVENOUS at 20:24

## 2023-07-19 RX ADMIN — Medication 10 ML: at 08:48

## 2023-07-19 RX ADMIN — DICLOFENAC SODIUM 2 G: 10 GEL TOPICAL at 08:47

## 2023-07-19 RX ADMIN — CETIRIZINE HYDROCHLORIDE 5 MG: 10 TABLET, FILM COATED ORAL at 08:46

## 2023-07-19 RX ADMIN — LOSARTAN POTASSIUM 100 MG: 50 TABLET, FILM COATED ORAL at 08:46

## 2023-07-19 RX ADMIN — PANTOPRAZOLE SODIUM 40 MG: 40 TABLET, DELAYED RELEASE ORAL at 17:11

## 2023-07-19 RX ADMIN — MULTIPLE VITAMINS W/ MINERALS TAB 1 TABLET: TAB at 08:47

## 2023-07-19 RX ADMIN — ASPIRIN 81 MG: 81 TABLET, CHEWABLE ORAL at 17:11

## 2023-07-19 RX ADMIN — Medication 10 ML: at 20:25

## 2023-07-19 RX ADMIN — MECLIZINE 25 MG: 12.5 TABLET ORAL at 14:25

## 2023-07-19 RX ADMIN — MECLIZINE 25 MG: 12.5 TABLET ORAL at 20:24

## 2023-07-19 RX ADMIN — Medication 12.5 MG: at 20:24

## 2023-07-19 RX ADMIN — THIAMINE HYDROCHLORIDE 200 MG: 100 INJECTION, SOLUTION INTRAMUSCULAR; INTRAVENOUS at 05:52

## 2023-07-19 RX ADMIN — TRAZODONE HYDROCHLORIDE 100 MG: 100 TABLET ORAL at 20:24

## 2023-07-19 RX ADMIN — CYANOCOBALAMIN TAB 1000 MCG 1000 MCG: 1000 TAB at 08:47

## 2023-07-19 NOTE — THERAPY DISCHARGE NOTE
Acute Care - Occupational Therapy Discharge  [facility]    Patient Name: Catrachita Brunson  : 1945    MRN: [id number]                              Today's Date: 2023       Admit Date: 2023    Visit Dx:     ICD-10-CM ICD-9-CM   1. Hypertensive urgency  I16.0 401.9   2. New daily persistent headache  G44.52 339.42   3. Dizziness  R42 780.4     Patient Active Problem List   Diagnosis    Preop cardiovascular exam    Wellness examination    Primary hypertension    Paroxysmal atrial fibrillation    Systolic heart failure    Bradycardia, sinus    Family history of breast cancer    Hypertensive urgency    Dizziness    Falls    Alcohol abuse    Elevated troponin     Past Medical History:   Diagnosis Date    Arrhythmia     Goiter     Hypertension     PAF (paroxysmal atrial fibrillation)      Past Surgical History:   Procedure Laterality Date    CARPAL TUNNEL RELEASE      FOOT SURGERY Bilateral     GASTRIC BYPASS      HYSTERECTOMY      JOINT REPLACEMENT      both knees, both shoulders    OOPHORECTOMY      SPINE SURGERY      cervicla disk replacement    THYROID SURGERY        General Information       Row Name 23 1046          OT Time and Intention    Document Type discharge evaluation/summary  -CS     Mode of Treatment occupational therapy  -CS       Row Name 23 1046          General Information    Patient Profile Reviewed yes  -CS     Prior Level of Function all household mobility;ADL's;dependent:;driving  Pt reports owning SPC/RW but does not use. Shower chair in place for safe bathing tasks. Dtr and CHLOÉ live across street.  -CS     Existing Precautions/Restrictions fall;other (see comments)  recent falls  -CS     Barriers to Rehab medically complex  -CS       Row Name 23 1046          Living Environment    People in Home alone  -CS       Row Name 23 1046          Home Main Entrance    Number of Stairs, Main Entrance one  -CS       Row Name 23 1046          Stairs Within Home,  Primary    Number of Stairs, Within Home, Primary none  -       Row Name 07/19/23 1046          Cognition    Orientation Status (Cognition) oriented x 4  -       Row Name 07/19/23 1046          Safety Issues, Functional Mobility    Impairments Affecting Function (Mobility) balance;endurance/activity tolerance  -     Comment, Safety Issues/Impairments (Mobility) mild dynamic standing balance deficit  -               User Key  (r) = Recorded By, (t) = Taken By, (c) = Cosigned By      Initials Name Provider Type     Lev Amador OT Occupational Therapist                   Mobility/ADL's       Row Name 07/19/23 1051          Bed Mobility    Comment, (Bed Mobility) UIC upon arrival  -[facility] Name 07/19/23 1051          Transfers    Transfers sit-stand transfer;stand-sit transfer  -     Comment, (Transfers) STS x 3, no AD, no LOB  -[facility] Name 07/19/23 1051          Sit-Stand Transfer    Sit-Stand [place] (Transfers) supervision  -[facility] Name 07/19/23 1051          Stand-Sit Transfer    Stand-Sit [place] (Transfers) supervision  -[facility] Name 07/19/23 1051          Functional Mobility    Functional Mobility- Comment defer to PT for specifis, SBA for in-room distances  -       Row Name 07/19/23 1051          Activities of Daily Living    BADL Assessment/Intervention lower body dressing;feeding;grooming  -[facility] Name 07/19/23 1051          Lower Body Dressing Assessment/Training    [place] Level (Lower Body Dressing) don;socks;pants/bottoms;standby assist  -     Position (Lower Body Dressing) edge of bed sitting;unsupported sitting  -       Row Name 07/19/23 1051          Self-Feeding Assessment/Training    [place] Level (Feeding) finger foods;liquids to mouth;prepare tray/open items;scoop food and bring to mouth;independent  -CS     Position (Self-Feeding) supported sitting  -       Row Name 07/19/23 1051          Grooming Assessment/Training     [place] Level (Grooming) hair care, combing/brushing;wash face, hands;oral care regimen  -CS     Position (Grooming) sink side  -CS     Comment, (Grooming) denture supplies issued  -CS               User Key  (r) = Recorded By, (t) = Taken By, (c) = Cosigned By      Initials Name Provider Type    CS Lev Amador, OT Occupational Therapist                   Obj/Interventions       Row Name 07/19/23 1053          Sensory Assessment (Somatosensory)    Sensory Assessment (Somatosensory) UE sensation intact  -[facility] Name 07/19/23 1053          Vision Assessment/Intervention    Visual Impairment/Limitations WFL;corrective lenses full-time  -CS       Row Name 07/19/23 1053          Range of Motion Comprehensive    General Range of Motion bilateral upper extremity ROM WFL  -[facility] Name 07/19/23 1053          Strength Comprehensive (MMT)    General Manual Muscle Testing (MMT) Assessment upper extremity strength deficits identified  -CS     Comment, General Manual Muscle Testing (MMT) Assessment BUE grossly 4+/5, appropriate for age, no signficant asymmetry  -       Row Name 07/19/23 1053          Motor Skills    Motor Skills coordination;functional endurance  -CS     Functional Endurance defer to PT for specifics  -CS       Row Name 07/19/23 1053          Balance    Balance Assessment sitting static balance;sitting dynamic balance;standing static balance;standing dynamic balance  -CS     Static Sitting Balance independent  -CS     Dynamic Sitting Balance supervision  -CS     Position, Sitting Balance unsupported;sitting edge of bed  -CS     Static Standing Balance standby assist  -CS     Dynamic Standing Balance contact guard  -CS     Position/Device Used, Standing Balance unsupported  -CS     Balance Interventions sitting;sit to stand;standing;occupation based/functional task  -CS               User Key  (r) = Recorded By, (t) = Taken By, (c) = Cosigned By      Initials Name Provider Type    CS  Lev Amador OT Occupational Therapist                   Goals/Plan    No documentation.                  Clinical Impression       Row Name 07/19/23 1055          Pain Assessment    Pretreatment Pain Rating 0/10 - no pain  -CS     Posttreatment Pain Rating 0/10 - no pain  -CS     Additional Documentation Pain Scale: FACES Pre/Post-Treatment (Group)  -CS       Row Name 07/19/23 1055          Pain Scale: FACES Pre/Post-Treatment    Pain: FACES Scale, Pretreatment 0-->no hurt  -CS     Posttreatment Pain Rating 0-->no hurt  -CS       Row Name 07/19/23 1054          Plan of Care Review    Plan of Care Reviewed With patient  -CS     Progress no change  -CS     Outcome Evaluation Pt presents at recent baseline for ADL completion and related mobility, symmetrical BUE strength and coordination intact. OT signing off, defer to PT for mobility recs. Recommend d/c to home with assist and support PT rec for HHPT.  -       Row Name 07/19/23 1059          Therapy Assessment/Plan (OT)    Criteria for Skilled Therapeutic Interventions Met (OT) no;does not meet criteria for skilled intervention  -CS     Therapy Frequency (OT) evaluation only  -       Row Name 07/19/23 1055          Vital Signs    Pre Systolic BP Rehab 166  RN cleared for eval, VSS on RA  -CS     Pre Treatment Diastolic BP 66  -CS     O2 Delivery Pre Treatment room air  -CS     O2 Delivery Intra Treatment room air  -CS     O2 Delivery Post Treatment room air  -CS     Pre Patient Position Sitting  -CS     Intra Patient Position Standing  -CS     Post Patient Position Sitting  -CS       Row Name 07/19/23 1057          Positioning and Restraints    Pre-Treatment Position in bed  -CS     Post Treatment Position bed  -CS     In Bed notified nsg;supine;encouraged to call for assist;exit alarm on;call light within reach  -CS               User Key  (r) = Recorded By, (t) = Taken By, (c) = Cosigned By      Initials Name Provider Type    CS Lev Amador OT  Occupational Therapist                   Outcome Measures       Row Name 07/19/23 1058          How much help from another is currently needed...    Putting on and taking off regular lower body clothing? 4  -CS     Bathing (including washing, rinsing, and drying) 3  -CS     Toileting (which includes using toilet bed pan or urinal) 4  -CS     Putting on and taking off regular upper body clothing 4  -CS     Taking care of personal grooming (such as brushing teeth) 4  -CS     Eating meals 4  -CS     AM-PAC 6 Clicks Score (OT) 23  -CS       Row Name 07/19/23 1029          How much help from another person do you currently need...    Turning from your back to your side while in flat bed without using bedrails? 4  -AB     Moving from lying on back to sitting on the side of a flat bed without bedrails? 3  -AB     Moving to and from a bed to a chair (including a wheelchair)? 3  -AB     Standing up from a chair using your arms (e.g., wheelchair, bedside chair)? 3  -AB     Climbing 3-5 steps with a railing? 3  -AB     To walk in hospital room? 3  -AB     AM-PAC 6 Clicks Score (PT) 19  -AB     Highest level of mobility 6 --> Walked 10 steps or more  -AB       Row Name 07/19/23 1058 07/19/23 1029       Functional Assessment    Outcome Measure Options AM-PAC 6 Clicks Daily Activity (OT)  -CS AM-PAC 6 Clicks Basic Mobility (PT)  -AB              User Key  (r) = Recorded By, (t) = Taken By, (c) = Cosigned By      Initials Name Provider Type    Lev Sher OT Occupational Therapist    AB Angelita Umanzor, PT Physical Therapist                  Occupational Therapy Education       Title: PT OT SLP Therapies (In Progress)       Topic: Occupational Therapy (In Progress)       Point: ADL training (Done)       Description:   Instruct learner(s) on proper safety adaptation and remediation techniques during self care or transfers.   Instruct in proper use of assistive devices.                  Learning Progress Summary              Patient Acceptance, E,D, VU,DU by  at 7/19/2023 1059                         Point: Home exercise program (Not Started)       Description:   Instruct learner(s) on appropriate technique for monitoring, assisting and/or progressing therapeutic exercises/activities.                  Learner Progress:  Not documented in this visit.              Point: Precautions (Done)       Description:   Instruct learner(s) on prescribed precautions during self-care and functional transfers.                  Learning Progress Summary             Patient Acceptance, E,D, VU,DU by  at 7/19/2023 1059                         Point: Body mechanics (Done)       Description:   Instruct learner(s) on proper positioning and spine alignment during self-care, functional mobility activities and/or exercises.                  Learning Progress Summary             Patient Acceptance, E,D, VU,DU by  at 7/19/2023 1059                                         User Key       Initials Effective Dates Name Provider Type Discipline     06/16/21 -  Lev Amador OT Occupational Therapist OT                  OT Recommendation and Plan  Therapy Frequency (OT): evaluation only  Plan of Care Review  Plan of Care Reviewed With: patient  Progress: no change  Outcome Evaluation: Pt presents at recent baseline for ADL completion and related mobility, symmetrical BUE strength and coordination intact. OT signing off, defer to PT for mobility recs. Recommend d/c to home with assist and support PT rec for HHPT.  Plan of Care Reviewed With: patient  Outcome Evaluation: Pt presents at recent baseline for ADL completion and related mobility, symmetrical BUE strength and coordination intact. OT signing off, defer to PT for mobility recs. Recommend d/c to home with assist and support PT rec for HHPT.     Time Calculation:   Evaluation Complexity (OT)  Review Occupational Profile/Medical/Therapy History Complexity: expanded/moderate complexity  Assessment,  Occupational Performance/Identification of Deficit Complexity: 1-3 performance deficits  Clinical Decision Making Complexity (OT): problem focused assessment/low complexity  Overall Complexity of Evaluation (OT): low complexity     Time Calculation- OT       Row Name 07/19/23 1100             Time Calculation- OT    OT Start Time 1010  -CS      OT Received On 07/19/23  -CS         Untimed Charges    OT Eval/Re-eval Minutes 48  -CS         Total Minutes    Untimed Charges Total Minutes 48  -CS       Total Minutes 48  -CS                User Key  (r) = Recorded By, (t) = Taken By, (c) = Cosigned By      Initials Name Provider Type    CS Lev Amador, OT Occupational Therapist                  Therapy Charges for Today       Code Description Service Date Service Provider Modifiers Qty    [id number] HC OT EVAL LOW COMPLEXITY 4 7/19/2023 Lev Amador OT GO 1                    Lev Amador OT  7/19/2023

## 2023-07-19 NOTE — PROGRESS NOTES
[facility] Medicine Services  PROGRESS NOTE    Patient Name: Catrachita Brunson  : 1945  MRN: [id number]    Date of Admission: 2023  Primary Care Physician: Maricel Cartagena DO    Subjective   Subjective     CC:  Dizziness, falls, etoh     HPI:  No dizziness at rest, no chest pain or dyspnea    ROS:  No f/c  No dysuria  No focal weakness  No cp  No palpitations  No dyspnea    Objective   Objective     Vital Signs:   Temp:  [97.8 °F (36.6 °C)-99.1 °F (37.3 °C)] 99.1 °F (37.3 °C)  Heart Rate:  [41-56] 53  Resp:  [16] 16  BP: (101-223)/() 113/65     Physical Exam:  Constitutional:Alert, oriented x 3, nontoxic appearing  Psych:Normal/appropriate affect  HEENT:NCAT, oropharynx clear  Neck: neck supple, full range of motion  Neuro: Face symmetric, speech clear, equal , moves all extremities  Cardiac: bradycardic, regular rhythm, 1/6 murmur; No pretibial pitting edema  Resp: CTAB, normal effort  GI: abd soft, nontender  Skin: No extremity rash  Musculoskeletal/extremities: no cyanosis of extremities; no significant ankle edema        Results Reviewed:  LAB RESULTS:      Lab 23  0350 23  1119   WBC 5.56 5.41   HEMOGLOBIN 11.4* 12.5   HEMATOCRIT 36.1 40.4   PLATELETS 221 276   NEUTROS ABS  --  2.74   IMMATURE GRANS (ABS)  --  0.02   LYMPHS ABS  --  1.82   MONOS ABS  --  0.64   EOS ABS  --  0.17   MCV 90.0 91.8         Lab 23  0350 23  1119   SODIUM 140 140   POTASSIUM 3.7 4.5   CHLORIDE 102 100   CO2 31.0* 31.0*   ANION GAP 7.0 9.0   BUN 12 11   CREATININE 0.79 0.81   EGFR 76.2 73.9   GLUCOSE 90 94   CALCIUM 8.7 9.4   MAGNESIUM 2.1  --    TSH 2.030  --          Lab 23  1119   TOTAL PROTEIN 7.0   ALBUMIN 4.6   GLOBULIN 2.4   ALT (SGPT) 11   AST (SGOT) 21   BILIRUBIN 0.5   ALK PHOS 60         Lab 23  1119   PROBNP  --   --  800.6   HSTROP T 9 12* 13*                 Brief Urine Lab Results  (Last result in the past  365 days)        Color   Clarity   Blood   Leuk Est   Nitrite   Protein   CREAT   Urine HCG        07/18/23 1145 Yellow   Clear   Small (1+)   Small (1+)   Negative   Negative                   Microbiology Results Abnormal       None            MRI Angiogram Head Without Contrast    Result Date: 7/18/2023  MRI ANGIOGRAM HEAD WO CONTRAST, MRI BRAIN WO CONTRAST, MRI ANGIOGRAM NECK W CONTRAST Date of Exam: 7/18/2023 3:11 PM EDT Indication: dizziness, headache.  Comparison: None available. Technique: Multiplanar multisequence MRI of the brain performed without IV contrast. Noncontrast time-of-flight 3-dimensional MR angiogram of the head with three-dimensional maximum intensity projections postprocessed. IV contrast enhanced MR angiogram of the neck was also performed. Findings: MRI brain: No acute infarct is present on diffusion weighted sequences. Midline structures are normal and the craniocervical junction appears satisfactory. Age-related changes are present, with mild volume loss and fairly prominent periventricular leukomalacia, likely reflecting chronic small vessel ischemic change. There is otherwise no evidence of intracranial hemorrhage, mass or mass effect. The ventricles are normal in size and configuration. The orbits are normal. The paranasal sinuses are grossly clear. MR angiogram head: The carotid siphons demonstrate no significant atherosclerotic narrowing. The anterior cerebral arteries are normal in course and caliber bilaterally. The right middle cerebral artery demonstrates no evidence of flow-limiting stenosis,  large vessel occlusion or aneurysm. The left middle cerebral artery is similarly normal in course and caliber. The vertebrobasilar system is patent. The posterior cerebral arteries are normal in course and caliber bilaterally. MR angiogram neck: The aortic arch demonstrates a typical three-vessel branching. On the right, there is no significant atherosclerotic narrowing of the ICA origin,  0% by NASCET criteria. On the left, there is no evidence of high-grade atherosclerotic narrowing, 0% by NASCET criteria, however there is questionable linear luminal irregularity, with the appearance of possible carotid web or short segment dissection. The remaining left ICA is normal. The vertebral arteries are normal in course and caliber bilaterally.     Impression: Impression: MRI of the brain demonstrates no evidence of acute infarct. Relatively advanced chronic changes are present, including prominent bilateral periventricular leukomalacia, most consistent with chronic small vessel ischemic change. There is otherwise no evidence of hemorrhage, mass or mass effect. MR angiogram of the neck demonstrates a focal luminal irregularity, appearing somewhat linear involving the posterior wall of the left ICA origin. There is no evidence of associated luminal narrowing, however finding raises concern for either a carotid web or short segment dissection. CT angiogram could be useful to further evaluate. MR angiogram of the head and neck is otherwise essentially normal, without evidence of flow-limiting stenosis, large vessel occlusion or aneurysm. Electronically Signed: Bakari Phan  7/18/2023 4:23 PM EDT  Workstation ID: [id number]    MRI Angiogram Neck With Contrast    Result Date: 7/18/2023  MRI ANGIOGRAM HEAD WO CONTRAST, MRI BRAIN WO CONTRAST, MRI ANGIOGRAM NECK W CONTRAST Date of Exam: 7/18/2023 3:11 PM EDT Indication: dizziness, headache.  Comparison: None available. Technique: Multiplanar multisequence MRI of the brain performed without IV contrast. Noncontrast time-of-flight 3-dimensional MR angiogram of the head with three-dimensional maximum intensity projections postprocessed. IV contrast enhanced MR angiogram of the neck was also performed. Findings: MRI brain: No acute infarct is present on diffusion weighted sequences. Midline structures are normal and the craniocervical junction appears satisfactory.  Age-related changes are present, with mild volume loss and fairly prominent periventricular leukomalacia, likely reflecting chronic small vessel ischemic change. There is otherwise no evidence of intracranial hemorrhage, mass or mass effect. The ventricles are normal in size and configuration. The orbits are normal. The paranasal sinuses are grossly clear. MR angiogram head: The carotid siphons demonstrate no significant atherosclerotic narrowing. The anterior cerebral arteries are normal in course and caliber bilaterally. The right middle cerebral artery demonstrates no evidence of flow-limiting stenosis,  large vessel occlusion or aneurysm. The left middle cerebral artery is similarly normal in course and caliber. The vertebrobasilar system is patent. The posterior cerebral arteries are normal in course and caliber bilaterally. MR angiogram neck: The aortic arch demonstrates a typical three-vessel branching. On the right, there is no significant atherosclerotic narrowing of the ICA origin, 0% by NASCET criteria. On the left, there is no evidence of high-grade atherosclerotic narrowing, 0% by NASCET criteria, however there is questionable linear luminal irregularity, with the appearance of possible carotid web or short segment dissection. The remaining left ICA is normal. The vertebral arteries are normal in course and caliber bilaterally.     Impression: Impression: MRI of the brain demonstrates no evidence of acute infarct. Relatively advanced chronic changes are present, including prominent bilateral periventricular leukomalacia, most consistent with chronic small vessel ischemic change. There is otherwise no evidence of hemorrhage, mass or mass effect. MR angiogram of the neck demonstrates a focal luminal irregularity, appearing somewhat linear involving the posterior wall of the left ICA origin. There is no evidence of associated luminal narrowing, however finding raises concern for either a carotid web or  short segment dissection. CT angiogram could be useful to further evaluate. MR angiogram of the head and neck is otherwise essentially normal, without evidence of flow-limiting stenosis, large vessel occlusion or aneurysm. Electronically Signed: Bakari Phan  7/18/2023 4:23 PM EDT  Workstation ID: [id number]    MRI Brain Without Contrast    Result Date: 7/18/2023  MRI ANGIOGRAM HEAD WO CONTRAST, MRI BRAIN WO CONTRAST, MRI ANGIOGRAM NECK W CONTRAST Date of Exam: 7/18/2023 3:11 PM EDT Indication: dizziness, headache.  Comparison: None available. Technique: Multiplanar multisequence MRI of the brain performed without IV contrast. Noncontrast time-of-flight 3-dimensional MR angiogram of the head with three-dimensional maximum intensity projections postprocessed. IV contrast enhanced MR angiogram of the neck was also performed. Findings: MRI brain: No acute infarct is present on diffusion weighted sequences. Midline structures are normal and the craniocervical junction appears satisfactory. Age-related changes are present, with mild volume loss and fairly prominent periventricular leukomalacia, likely reflecting chronic small vessel ischemic change. There is otherwise no evidence of intracranial hemorrhage, mass or mass effect. The ventricles are normal in size and configuration. The orbits are normal. The paranasal sinuses are grossly clear. MR angiogram head: The carotid siphons demonstrate no significant atherosclerotic narrowing. The anterior cerebral arteries are normal in course and caliber bilaterally. The right middle cerebral artery demonstrates no evidence of flow-limiting stenosis,  large vessel occlusion or aneurysm. The left middle cerebral artery is similarly normal in course and caliber. The vertebrobasilar system is patent. The posterior cerebral arteries are normal in course and caliber bilaterally. MR angiogram neck: The aortic arch demonstrates a typical three-vessel branching. On the right, there is no  significant atherosclerotic narrowing of the ICA origin, 0% by NASCET criteria. On the left, there is no evidence of high-grade atherosclerotic narrowing, 0% by NASCET criteria, however there is questionable linear luminal irregularity, with the appearance of possible carotid web or short segment dissection. The remaining left ICA is normal. The vertebral arteries are normal in course and caliber bilaterally.     Impression: Impression: MRI of the brain demonstrates no evidence of acute infarct. Relatively advanced chronic changes are present, including prominent bilateral periventricular leukomalacia, most consistent with chronic small vessel ischemic change. There is otherwise no evidence of hemorrhage, mass or mass effect. MR angiogram of the neck demonstrates a focal luminal irregularity, appearing somewhat linear involving the posterior wall of the left ICA origin. There is no evidence of associated luminal narrowing, however finding raises concern for either a carotid web or short segment dissection. CT angiogram could be useful to further evaluate. MR angiogram of the head and neck is otherwise essentially normal, without evidence of flow-limiting stenosis, large vessel occlusion or aneurysm. Electronically Signed: Bakari Phan  7/18/2023 4:23 PM EDT  Workstation ID: [id number]    XR Chest 1 View    Result Date: 7/18/2023  XR CHEST 1 VW Date of Exam: 7/18/2023 11:25 AM EDT Indication: uncontrolled HTN, generalized weakness Comparison: None available. Findings: There is mild cardiomegaly. There is pulmonary vascular congestion. Lung fields are well inflated. There is no focal consolidating infiltrate and there is no effusion.     Impression: Impression: Findings suggest mild CHF. Follow-up exam may be useful. Electronically Signed: Puja Coronado MD  7/18/2023 11:37 AM EDT  Workstation ID: [id number]     Results for orders placed during the hospital encounter of 01/27/23    Adult Transthoracic Echo Complete w/  Color, Spectral and Contrast if necessary per protocol    Interpretation Summary    Left ventricular systolic function is hyperdynamic (EF > 70%).    Left ventricular wall thickness is consistent with hypertrophy. Sigmoid-shaped ventricular septum is present.    Left ventricular diastolic function is consistent with (grade II w/high LAP) pseudonormalization.    Left atrial volume is moderately increased.    The aortic valve is abnormal in structure. There is mild calcification of the aortic valve. The aortic valve appears trileaflet. Mild aortic valve regurgitation is present. No hemodynamically significant aortic valve stenosis is present.    Estimated right ventricular systolic pressure from tricuspid regurgitation is normal (<35 mmHg).    Mild dilation of the ascending aorta is present.    The right atrial cavity is dilated.    The right atrial cavity is dilated. The inferior vena cava is normally sized. The diameter of the inferior vena cava is 1.3 cm. Normal IVC inspiratory collapse of greater than 50% noted.      Current medications:  Scheduled Meds:[START ON 7/20/2023] amiodarone, 100 mg, Oral, Q24H  cetirizine, 5 mg, Oral, Daily  Diclofenac Sodium, 2 g, Topical, 4x Daily  folic acid, 1 mg, Oral, Daily  losartan, 100 mg, Oral, Daily  meclizine, 25 mg, Oral, Q8H  metoprolol tartrate, 12.5 mg, Oral, BID  multivitamin, 1 tablet, Oral, Daily  multivitamin with minerals, 1 tablet, Oral, Daily  rivaroxaban, 20 mg, Oral, Daily With Dinner  senna-docusate sodium, 2 tablet, Oral, BID  sodium chloride, 10 mL, Intravenous, Q12H  thiamine (B-1) IV, 200 mg, Intravenous, Q8H   Followed by  [START ON 7/24/2023] thiamine, 100 mg, Oral, Daily  traZODone, 100 mg, Oral, Nightly  vitamin B-12, 1,000 mcg, Oral, Daily      Continuous Infusions:   PRN Meds:.  acetaminophen    senna-docusate sodium **AND** polyethylene glycol **AND** bisacodyl **AND** bisacodyl    cloNIDine    LORazepam **OR** LORazepam **OR** LORazepam **OR**  LORazepam **OR** LORazepam **OR** LORazepam    melatonin    ondansetron    sodium chloride    sodium chloride    Assessment & Plan   Assessment & Plan     Active Hospital Problems    Diagnosis  POA    **Hypertensive urgency [I16.0]  Yes    Dizziness [R42]  Unknown    Falls [W19.XXXA]  Unknown    Alcohol abuse [F10.10]  Unknown    Elevated troponin [R77.8]  Unknown    Paroxysmal atrial fibrillation [I48.0]  Yes    Systolic heart failure [I50.20]  Yes    Bradycardia, sinus [R00.1]  Yes    Primary hypertension [I10]  Yes      Resolved Hospital Problems   No resolved problems to display.        Brief Hospital Course to date:  Catrachita Brunson is a 78 y.o. female     Dizziness  Hypertensive emergency w/ hx HTN  -initial bp 200's/100's  -admits not always compliant w/ medications  -tsh ok  -u/a ok  -mri brain negative cva  -given amlodipine 5mg, home losartan, metoprolol. Sbp 110's. I strongly suspect medical noncompliance (as bp dramatically dropped after restarting home meds)  -schedule meclizine  -stop amlodipine; continue home losartan, decrease metoprolol (sinus bradycardia). Monitor bp  -pt/ot both recommend home w/ home health per 7/19/23 evals    Left ica ulcerated atherosclerotic plaque  Abnormal appearing area of left ica on mra neck  prompted cta neck. Cta neck revealed small area ulcerated atherosclerotic plaque  -add asa 81 and statin tonight; plan to review w/ neuro stroke tomorrow  -plan to review    ETOH Abuse  -drinks 4-5 beer most nights; last drink 7/17/23 evening (night prior to admission)  -thiamine, folic acid replacement  -prn ativan w/ ciwa checks    Parox afib  Sinus bradycardia  -previous echo 1/27/23: EF >70%, diastolic dysfunction noted, mild ai  -heart rate high 40's, sinus  -continue amiodarone 100mg daily, decrease metoprolol to 12.5mg bid w/ hold parameters; monitor bp and heart rate  -continue xarelto  -follows w/ Dr. Nichols outpatient (next appt 1/26/24)    Am labs: cbc,bmp,mag (follow up  cta neck)    Expected Discharge Location and Transportation: home w/ home health pt  Expected Discharge   Expected Discharge Date: 7/20/2023; Expected Discharge Time:      DVT prophylaxis:  Medical DVT prophylaxis orders are present.          CODE STATUS:   Code Status and Medical Interventions:   Ordered at: 07/18/23 1735     Medical Intervention Limits:    NO intubation (DNI)    NO cardioversion     Code Status (Patient has no pulse and is not breathing):    No CPR (Do Not Attempt to Resuscitate)     Medical Interventions (Patient has pulse or is breathing):    Limited Support     Release to patient:    Routine Release       Mason Gasca MD  07/19/23

## 2023-07-19 NOTE — PLAN OF CARE
Goal Outcome Evaluation:  Plan of Care Reviewed With: patient        Progress: no change  Outcome Evaluation: PT initial eval completed. Pt presents below her functional baseline with deficits in strength, balance, and endurance. Ambulation of 150' with CGA and RW was well tolerated. Pt will benefit from further IPPT for addressing deficits. PT rec d/c home with assist, RW, and HHPT when medically appropriate.      Anticipated Discharge Disposition (PT): home with assist, home with home health

## 2023-07-19 NOTE — THERAPY EVALUATION
Patient Name: Catrachita Brunson  : 1945    MRN: [id number]                              Today's Date: 2023       Admit Date: 2023    Visit Dx:     ICD-10-CM ICD-9-CM   1. Hypertensive urgency  I16.0 401.9   2. New daily persistent headache  G44.52 339.42   3. Dizziness  R42 780.4     Patient Active Problem List   Diagnosis    Preop cardiovascular exam    Wellness examination    Primary hypertension    Paroxysmal atrial fibrillation    Systolic heart failure    Bradycardia, sinus    Family history of breast cancer    Hypertensive urgency    Dizziness    Falls    Alcohol abuse    Elevated troponin     Past Medical History:   Diagnosis Date    Arrhythmia     Goiter     Hypertension     PAF (paroxysmal atrial fibrillation)      Past Surgical History:   Procedure Laterality Date    CARPAL TUNNEL RELEASE      FOOT SURGERY Bilateral     GASTRIC BYPASS      HYSTERECTOMY      JOINT REPLACEMENT      both knees, both shoulders    OOPHORECTOMY      SPINE SURGERY      cervicla disk replacement    THYROID SURGERY        General Information       Row Name 23 1018          Physical Therapy Time and Intention    Document Type evaluation  -AB     Mode of Treatment physical therapy  -AB       Row Name 23 1018          General Information    Patient Profile Reviewed yes  -AB     Prior Level of Function independent:;all household mobility;community mobility;gait;transfer;bed mobility;ADL's;using stairs;dependent:;driving  Owns cane but does not use. Endorses mutliple falls in last 6 months. Reports daughter takes her to get groceries but can be unreliable.  -AB     Existing Precautions/Restrictions fall  -AB     Barriers to Rehab medically complex  -AB       Row Name 23 1018          Living Environment    People in Home alone  -AB       Row Name 23 1018          Home Main Entrance    Number of Stairs, Main Entrance one  -AB     Stair Railings, Main Entrance none  -AB       Row Name 23 1018           Stairs Within Home, Primary    Number of Stairs, Within Home, Primary none  -AB       Row Name 07/19/23 1018          Cognition    Orientation Status (Cognition) oriented x 4  -AB       Row Name 07/19/23 1018          Safety Issues, Functional Mobility    Safety Issues Affecting Function (Mobility) awareness of need for assistance;insight into deficits/self-awareness;safety precaution awareness;safety precautions follow-through/compliance  -AB     Impairments Affecting Function (Mobility) balance;endurance/activity tolerance;strength  -AB               User Key  (r) = Recorded By, (t) = Taken By, (c) = Cosigned By      Initials Name Provider Type    AB Angelita Umanzor PT Physical Therapist                   Mobility       Row Name 07/19/23 1022          Bed Mobility    Bed Mobility supine-sit;scooting/bridging  -AB     Scooting/Bridging [place] (Bed Mobility) standby assist  -AB     Supine-Sit [place] (Bed Mobility) contact guard  -AB     Assistive Device (Bed Mobility) head of bed elevated  -AB     Comment, (Bed Mobility) Increased time/effort. Reported dizziness sittiing EOB, BP stable.  -AB       Row Name 07/19/23 1022          Transfers    Comment, (Transfers) Cues for hand placement and sequencing.  -AB       Row Name 07/19/23 1022          Bed-Chair Transfer    Bed-Chair [place] (Transfers) contact guard;1 person assist  -AB     Assistive Device (Bed-Chair Transfers) walker, front-wheeled  -AB       Row Name 07/19/23 1022          Sit-Stand Transfer    Sit-Stand [place] (Transfers) contact guard;1 person assist;verbal cues  -AB     Assistive Device (Sit-Stand Transfers) walker, front-wheeled  -AB     Comment, (Sit-Stand Transfer) Cues to push up from bed prior to transitioning hands to walker.  -AB       Row Name 07/19/23 1022          Gait/Stairs (Locomotion)    [place] Level (Gait) contact guard;1 person assist;verbal cues  -AB     Assistive Device (Gait) walker, front-wheeled   -AB     Distance in Feet (Gait) 150'  -AB     Deviations/Abnormal Patterns (Gait) bilateral deviations;suzanne decreased;gait speed decreased;stride length decreased  -AB     Bilateral Gait Deviations forward flexed posture;heel strike decreased  -AB     [place] Level (Stairs) unable to assess  -AB     Comment, (Gait/Stairs) Pt demo's step through gait pattern with decreased stride length and suzanne. Cues for increased stride length and upright posture. No overt LOB. Further activity limited by fatigue.  -AB               User Key  (r) = Recorded By, (t) = Taken By, (c) = Cosigned By      Initials Name Provider Type    AB Angelita Umanzor, PT Physical Therapist                   Obj/Interventions       Row Name 07/19/23 1025          Range of Motion Comprehensive    General Range of Motion bilateral lower extremity ROM WNL  -AB       Row Name 07/19/23 1025          Strength Comprehensive (MMT)    General Manual Muscle Testing (MMT) Assessment lower extremity strength deficits identified  -AB     Comment, General Manual Muscle Testing (MMT) Assessment BLE strength grossly 4/5  -AB       Row Name 07/19/23 1025          Balance    Balance Assessment sitting static balance;sitting dynamic balance;standing static balance;standing dynamic balance  -AB     Static Sitting Balance supervision  -AB     Dynamic Sitting Balance standby assist  -AB     Position, Sitting Balance unsupported;sitting edge of bed  -AB     Static Standing Balance contact guard;1-person assist  -AB     Dynamic Standing Balance contact guard;1-person assist;verbal cues  -AB     Position/Device Used, Standing Balance supported;walker, front-wheeled  -AB     Balance Interventions sitting;standing;sit to stand;supported;static;dynamic  -AB     Comment, Balance No overt LOB.  -AB       Row Name 07/19/23 1025          Sensory Assessment (Somatosensory)    Sensory Assessment (Somatosensory) LE sensation intact  -AB               User Key  (r) =  Recorded By, (t) = Taken By, (c) = Cosigned By      Initials Name Provider Type    AB Angelita Umanzor, PT Physical Therapist                   Goals/Plan       Row Name 07/19/23 1028          Bed Mobility Goal 1 (PT)    Activity/Assistive Device (Bed Mobility Goal 1, PT) sit to supine;supine to sit  -AB     [place] Level/Cues Needed (Bed Mobility Goal 1, PT) independent  -AB     Time Frame (Bed Mobility Goal 1, PT) long term goal (LTG);10 days  -AB       Row Name 07/19/23 1028          Transfer Goal 1 (PT)    Activity/Assistive Device (Transfer Goal 1, PT) sit-to-stand/stand-to-sit;bed-to-chair/chair-to-bed;walker, rolling  -AB     [place] Level/Cues Needed (Transfer Goal 1, PT) modified independence  -AB     Time Frame (Transfer Goal 1, PT) long term goal (LTG);10 days  -AB       Row Name 07/19/23 1028          Gait Training Goal 1 (PT)    Activity/Assistive Device (Gait Training Goal 1, PT) gait (walking locomotion);assistive device use;walker, rolling  -AB     [place] Level (Gait Training Goal 1, PT) modified independence  -AB     Distance (Gait Training Goal 1, PT) 400  -AB     Time Frame (Gait Training Goal 1, PT) long term goal (LTG);10 days  -AB       Row Name 07/19/23 1028          Stairs Goal 1 (PT)    Activity/Assistive Device (Stairs Goal 1, PT) ascending stairs;descending stairs;walker, rolling  -AB     [place] Level/Cues Needed (Stairs Goal 1, PT) contact guard required  -AB     Number of Stairs (Stairs Goal 1, PT) 1  -AB     Time Frame (Stairs Goal 1, PT) long term goal (LTG);10 days  -AB       Row Name 07/19/23 1028          Therapy Assessment/Plan (PT)    Planned Therapy Interventions (PT) balance training;bed mobility training;gait training;home exercise program;postural re-education;patient/family education;stair training;strengthening;stretching;transfer training  -AB               User Key  (r) = Recorded By, (t) = Taken By, (c) = Cosigned By      Initials Name Provider Type     AB Angelita Umanzor, PT Physical Therapist                   Clinical Impression       Row Name 07/19/23 1026          Pain    Pretreatment Pain Rating 0/10 - no pain  -AB     Posttreatment Pain Rating 0/10 - no pain  -AB     Additional Documentation Pain Scale: Numbers Pre/Post-Treatment (Group);Pain Scale: FACES Pre/Post-Treatment (Group)  -AB       Row Name 07/19/23 1026          Pain Scale: FACES Pre/Post-Treatment    Pain: FACES Scale, Pretreatment 0-->no hurt  -AB     Posttreatment Pain Rating 0-->no hurt  -AB       Row Name 07/19/23 1026          Plan of Care Review    Plan of Care Reviewed With patient  -AB     Progress no change  -AB     Outcome Evaluation PT initial eval completed. Pt presents below her functional baseline with deficits in strength, balance, and endurance. Ambulation of 150' with CGA and RW was well tolerated. Pt will benefit from further IPPT for addressing deficits. PT rec d/c home with assist, RW, and HHPT when medically appropriate.  -AB       Row Name 07/19/23 1026          Therapy Assessment/Plan (PT)    Rehab Potential (PT) good, to achieve stated therapy goals  -AB     Criteria for Skilled Interventions Met (PT) yes;meets criteria;skilled treatment is necessary  -AB     Therapy Frequency (PT) daily  -AB       Row Name 07/19/23 1026          Vital Signs    Pre Systolic BP Rehab 113  -AB     Pre Treatment Diastolic BP 65  -AB     Intra Systolic BP Rehab 117  -AB     Intra Treatment Diastolic BP 59  -AB     Post Systolic BP Rehab 116  -AB     Post Treatment Diastolic BP 66  -AB     O2 Delivery Pre Treatment room air  -AB     O2 Delivery Intra Treatment room air  -AB     O2 Delivery Post Treatment room air  -AB     Pre Patient Position Supine  -AB     Intra Patient Position Standing  -AB     Post Patient Position Sitting  -AB       Row Name 07/19/23 1026          Positioning and Restraints    Pre-Treatment Position in bed  -AB     Post Treatment Position chair  -AB     In Chair  notified nsg;reclined;sitting;call light within reach;encouraged to call for assist;exit alarm on;legs elevated;waffle cushion  -AB               User Key  (r) = Recorded By, (t) = Taken By, (c) = Cosigned By      Initials Name Provider Type    Angelita Zafar PT Physical Therapist                   Outcome Measures       Row Name 07/19/23 1029          How much help from another person do you currently need...    Turning from your back to your side while in flat bed without using bedrails? 4  -AB     Moving from lying on back to sitting on the side of a flat bed without bedrails? 3  -AB     Moving to and from a bed to a chair (including a wheelchair)? 3  -AB     Standing up from a chair using your arms (e.g., wheelchair, bedside chair)? 3  -AB     Climbing 3-5 steps with a railing? 3  -AB     To walk in hospital room? 3  -AB     AM-PAC 6 Clicks Score (PT) 19  -AB     Highest level of mobility 6 --> Walked 10 steps or more  -AB       Row Name 07/19/23 1029          Functional Assessment    Outcome Measure Options AM-PAC 6 Clicks Basic Mobility (PT)  -AB               User Key  (r) = Recorded By, (t) = Taken By, (c) = Cosigned By      Initials Name Provider Type    Angelita Zafar PT Physical Therapist                                 Physical Therapy Education       Title: PT OT SLP Therapies (In Progress)       Topic: Physical Therapy (In Progress)       Point: Mobility training (Done)       Learning Progress Summary             Patient Acceptance, E,D, VU,NR by AB at 7/19/2023 1029                         Point: Home exercise program (Not Started)       Learner Progress:  Not documented in this visit.              Point: Body mechanics (Done)       Learning Progress Summary             Patient Acceptance, E,D, VU,NR by AB at 7/19/2023 1029                         Point: Precautions (Done)       Learning Progress Summary             Patient Acceptance, E,D, VU,NR by AB at 7/19/2023 1029                                          User Key       Initials Effective Dates Name Provider Type Discipline    AB 09/22/22 -  Angelita Umanzor, PT Physical Therapist PT                  PT Recommendation and Plan  Planned Therapy Interventions (PT): balance training, bed mobility training, gait training, home exercise program, postural re-education, patient/family education, stair training, strengthening, stretching, transfer training  Plan of Care Reviewed With: patient  Progress: no change  Outcome Evaluation: PT initial eval completed. Pt presents below her functional baseline with deficits in strength, balance, and endurance. Ambulation of 150' with CGA and RW was well tolerated. Pt will benefit from further IPPT for addressing deficits. PT rec d/c home with assist, RW, and HHPT when medically appropriate.     Time Calculation:   PT Evaluation Complexity  History, PT Evaluation Complexity: 1-2 personal factors and/or comorbidities  Examination of Body Systems (PT Eval Complexity): total of 3 or more elements  Clinical Presentation (PT Evaluation Complexity): stable  Clinical Decision Making (PT Evaluation Complexity): low complexity  Overall Complexity (PT Evaluation Complexity): low complexity     PT Charges       Row Name 07/19/23 1030             Time Calculation    Start Time 0942  -AB      PT Received On 07/19/23  -AB      PT Goal Re-Cert Due Date 07/29/23  -AB         Untimed Charges    PT Eval/Re-eval Minutes 48  -AB         Total Minutes    Untimed Charges Total Minutes 48  -AB       Total Minutes 48  -AB                User Key  (r) = Recorded By, (t) = Taken By, (c) = Cosigned By      Initials Name Provider Type    AB Angelita Umanzor, PT Physical Therapist                  Therapy Charges for Today       Code Description Service Date Service Provider Modifiers Qty    [id number]  PT EVAL LOW COMPLEXITY 4 7/19/2023 Angelita Umanzor, PT GP 1            PT G-Codes  Outcome Measure Options: AM-PAC 6 Clicks Basic Mobility  (PT)  AM-PAC 6 Clicks Score (PT): 19  PT Discharge Summary  Anticipated Discharge Disposition (PT): home with assist, home with home health    Angelita Umanzor, PT  7/19/2023

## 2023-07-19 NOTE — PLAN OF CARE
Goal Outcome Evaluation:  Plan of Care Reviewed With: patient        Progress: no change  Outcome Evaluation: Pt presents at recent baseline for ADL completion and related mobility, symmetrical BUE strength and coordination intact. OT signing off, defer to PT for mobility recs. Recommend d/c to home with assist and support PT rec for HHPT.

## 2023-07-20 ENCOUNTER — TELEPHONE (OUTPATIENT)
Dept: NEUROLOGY | Facility: CLINIC | Age: 78
End: 2023-07-20

## 2023-07-20 VITALS
RESPIRATION RATE: 17 BRPM | HEART RATE: 50 BPM | HEIGHT: 60 IN | WEIGHT: 143 LBS | BODY MASS INDEX: 28.07 KG/M2 | OXYGEN SATURATION: 96 % | SYSTOLIC BLOOD PRESSURE: 128 MMHG | DIASTOLIC BLOOD PRESSURE: 70 MMHG | TEMPERATURE: 98.5 F

## 2023-07-20 LAB
ANION GAP SERPL CALCULATED.3IONS-SCNC: 7 MMOL/L (ref 5–15)
BUN SERPL-MCNC: 12 MG/DL (ref 8–23)
BUN/CREAT SERPL: 14.3 (ref 7–25)
CALCIUM SPEC-SCNC: 8.6 MG/DL (ref 8.6–10.5)
CHLORIDE SERPL-SCNC: 101 MMOL/L (ref 98–107)
CO2 SERPL-SCNC: 32 MMOL/L (ref 22–29)
CREAT SERPL-MCNC: 0.84 MG/DL (ref 0.57–1)
DEPRECATED RDW RBC AUTO: 50.4 FL (ref 37–54)
EGFRCR SERPLBLD CKD-EPI 2021: 70.8 ML/MIN/1.73
ERYTHROCYTE [DISTWIDTH] IN BLOOD BY AUTOMATED COUNT: 15.3 % (ref 12.3–15.4)
GLUCOSE SERPL-MCNC: 89 MG/DL (ref 65–99)
HCT VFR BLD AUTO: 35.2 % (ref 34–46.6)
HGB BLD-MCNC: 11 G/DL (ref 12–15.9)
MAGNESIUM SERPL-MCNC: 2 MG/DL (ref 1.6–2.4)
MCH RBC QN AUTO: 28.1 PG (ref 26.6–33)
MCHC RBC AUTO-ENTMCNC: 31.3 G/DL (ref 31.5–35.7)
MCV RBC AUTO: 89.8 FL (ref 79–97)
PLATELET # BLD AUTO: 222 10*3/MM3 (ref 140–450)
PMV BLD AUTO: 9 FL (ref 6–12)
POTASSIUM SERPL-SCNC: 3.8 MMOL/L (ref 3.5–5.2)
RBC # BLD AUTO: 3.92 10*6/MM3 (ref 3.77–5.28)
SODIUM SERPL-SCNC: 140 MMOL/L (ref 136–145)
WBC NRBC COR # BLD: 6.13 10*3/MM3 (ref 3.4–10.8)

## 2023-07-20 PROCEDURE — G0378 HOSPITAL OBSERVATION PER HR: HCPCS

## 2023-07-20 PROCEDURE — 80048 BASIC METABOLIC PNL TOTAL CA: CPT | Performed by: INTERNAL MEDICINE

## 2023-07-20 PROCEDURE — 96376 TX/PRO/DX INJ SAME DRUG ADON: CPT

## 2023-07-20 PROCEDURE — 25010000002 THIAMINE PER 100 MG: Performed by: NURSE PRACTITIONER

## 2023-07-20 PROCEDURE — 83735 ASSAY OF MAGNESIUM: CPT | Performed by: INTERNAL MEDICINE

## 2023-07-20 PROCEDURE — 85027 COMPLETE CBC AUTOMATED: CPT | Performed by: INTERNAL MEDICINE

## 2023-07-20 RX ORDER — FOLIC ACID 1 MG/1
1 TABLET ORAL DAILY
Qty: 30 TABLET | Refills: 1 | Status: SHIPPED | OUTPATIENT
Start: 2023-07-21

## 2023-07-20 RX ORDER — LANOLIN ALCOHOL/MO/W.PET/CERES
100 CREAM (GRAM) TOPICAL DAILY
Qty: 30 TABLET | Refills: 1 | Status: SHIPPED | OUTPATIENT
Start: 2023-07-24

## 2023-07-20 RX ORDER — ATORVASTATIN CALCIUM 40 MG/1
80 TABLET, FILM COATED ORAL NIGHTLY
Qty: 30 TABLET | Refills: 1
Start: 2023-07-20 | End: 2023-07-27 | Stop reason: SDUPTHER

## 2023-07-20 RX ORDER — ATORVASTATIN CALCIUM 40 MG/1
40 TABLET, FILM COATED ORAL NIGHTLY
Qty: 30 TABLET | Refills: 1 | Status: SHIPPED | OUTPATIENT
Start: 2023-07-20 | End: 2023-07-20 | Stop reason: SDUPTHER

## 2023-07-20 RX ORDER — ASPIRIN 81 MG/1
81 TABLET, CHEWABLE ORAL DAILY
Start: 2023-07-21

## 2023-07-20 RX ORDER — MULTIPLE VITAMINS W/ MINERALS TAB 9MG-400MCG
1 TAB ORAL DAILY
Start: 2023-07-21

## 2023-07-20 RX ORDER — MECLIZINE HYDROCHLORIDE 25 MG/1
25 TABLET ORAL EVERY 8 HOURS SCHEDULED
Qty: 6 TABLET | Refills: 0 | Status: SHIPPED | OUTPATIENT
Start: 2023-07-20 | End: 2023-07-22

## 2023-07-20 RX ADMIN — AMIODARONE HYDROCHLORIDE 100 MG: 200 TABLET ORAL at 08:22

## 2023-07-20 RX ADMIN — ASPIRIN 81 MG: 81 TABLET, CHEWABLE ORAL at 08:22

## 2023-07-20 RX ADMIN — CETIRIZINE HYDROCHLORIDE 5 MG: 10 TABLET, FILM COATED ORAL at 08:21

## 2023-07-20 RX ADMIN — DICLOFENAC SODIUM 2 G: 10 GEL TOPICAL at 08:22

## 2023-07-20 RX ADMIN — THIAMINE HYDROCHLORIDE 200 MG: 100 INJECTION, SOLUTION INTRAMUSCULAR; INTRAVENOUS at 05:41

## 2023-07-20 RX ADMIN — CYANOCOBALAMIN TAB 1000 MCG 1000 MCG: 1000 TAB at 08:22

## 2023-07-20 RX ADMIN — LOSARTAN POTASSIUM 100 MG: 50 TABLET, FILM COATED ORAL at 08:22

## 2023-07-20 RX ADMIN — MULTIPLE VITAMINS W/ MINERALS TAB 1 TABLET: TAB at 08:21

## 2023-07-20 RX ADMIN — MECLIZINE 25 MG: 12.5 TABLET ORAL at 05:41

## 2023-07-20 RX ADMIN — FOLIC ACID 1 MG: 1 TABLET ORAL at 08:22

## 2023-07-20 RX ADMIN — Medication 10 ML: at 08:23

## 2023-07-20 RX ADMIN — Medication 12.5 MG: at 08:22

## 2023-07-20 RX ADMIN — PANTOPRAZOLE SODIUM 40 MG: 40 TABLET, DELAYED RELEASE ORAL at 05:41

## 2023-07-20 NOTE — TELEPHONE ENCOUNTER
Caller: Catrachita Brunson    Relationship to patient: Self    Best call back number: [phone]    New or established patient?  [x] New  [] Established    Date of discharge:7/20/23    Facility discharged from:      Diagnosis/Symptoms: HYPERTENSIVE URGENCY, HA, & DIZZINESS     Length of stay (If applicable): 1 DAY    Specialty Only: Did you see a [facility] health provider?    [x] Yes  [] No  If so, who? SUZANNA ALANIZ W/  TELEPHONED TO REQUEST [facility] F/U FOR PATIENT FOR DX:HYPERTENSIVE URGENCY, HA, & DIZZINESS. PATIENT SAW FOR STROKE WORK (TIA) UP BY VANNA & ADVISED TO F/U WITH STROKE CLINIC IN 3 MONTHS.    STROKE DOES NOT SEE FOR DS:HYPERTENSIVE URGENCY, HA, & DIZZINESS     OK TO SCHEDULE? IF SO, WITH WHICH PROVIDER?    PLEASE REVIEW & ADVISE-THANK YOU

## 2023-07-20 NOTE — PLAN OF CARE
Problem: Adult Inpatient Plan of Care  Goal: Plan of Care Review  Outcome: Met  Goal: Patient-Specific Goal (Individualized)  Outcome: Met  Goal: Absence of Hospital-Acquired Illness or Injury  Outcome: Met  Intervention: Identify and Manage Fall Risk  Recent Flowsheet Documentation  Taken 7/20/2023 1210 by Ashley Cartagena RN  Safety Promotion/Fall Prevention:   toileting scheduled   safety round/check completed   room organization consistent   nonskid shoes/slippers when out of bed   lighting adjusted   fall prevention program maintained   clutter free environment maintained   assistive device/personal items within reach   activity supervised  Taken 7/20/2023 1000 by Ashley Cartagena RN  Safety Promotion/Fall Prevention:   toileting scheduled   safety round/check completed   room organization consistent   nonskid shoes/slippers when out of bed   lighting adjusted   fall prevention program maintained   clutter free environment maintained   assistive device/personal items within reach   activity supervised  Taken 7/20/2023 0755 by Ashley Cartagena RN  Safety Promotion/Fall Prevention:   toileting scheduled   safety round/check completed   room organization consistent   nonskid shoes/slippers when out of bed   lighting adjusted   fall prevention program maintained   clutter free environment maintained   assistive device/personal items within reach   activity supervised  Intervention: Prevent Skin Injury  Recent Flowsheet Documentation  Taken 7/20/2023 1210 by Ashley Cartagena RN  Body Position: legs elevated  Taken 7/20/2023 1000 by Ashley Cartagena RN  Body Position: legs elevated  Taken 7/20/2023 0755 by Ashley Cartagena RN  Body Position: sitting up in bed  Skin Protection:   tubing/devices free from skin contact   transparent dressing maintained   skin-to-skin areas padded   skin-to-device areas padded   adhesive use limited  Intervention: Prevent and Manage VTE (Venous Thromboembolism) Risk  Recent Flowsheet  Documentation  Taken 7/20/2023 1210 by Ashley Cartagena RN  Activity Management: activity encouraged  Taken 7/20/2023 1000 by Ashley Cartagena RN  Activity Management: activity encouraged  Taken 7/20/2023 0755 by Ashley Cartagena RN  Activity Management: ambulated outside room  VTE Prevention/Management:   patient refused intervention   sequential compression devices off  Intervention: Prevent Infection  Recent Flowsheet Documentation  Taken 7/20/2023 1210 by Ashley Cartagena RN  Infection Prevention: environmental surveillance performed  Taken 7/20/2023 1000 by Ashley Cartagena RN  Infection Prevention: environmental surveillance performed  Taken 7/20/2023 0755 by Ashley Cartagena RN  Infection Prevention:   environmental surveillance performed   equipment surfaces disinfected  Goal: Optimal Comfort and Wellbeing  Outcome: Met  Goal: Readiness for Transition of Care  Outcome: Met     Problem: Fall Injury Risk  Goal: Absence of Fall and Fall-Related Injury  Outcome: Met  Intervention: Identify and Manage Contributors  Recent Flowsheet Documentation  Taken 7/20/2023 1210 by Ashley Cartagena RN  Medication Review/Management: medications reviewed  Taken 7/20/2023 1000 by Ashley Cartagena RN  Medication Review/Management: medications reviewed  Intervention: Promote Injury-Free Environment  Recent Flowsheet Documentation  Taken 7/20/2023 1210 by Ashley Cartagena RN  Safety Promotion/Fall Prevention:   toileting scheduled   safety round/check completed   room organization consistent   nonskid shoes/slippers when out of bed   lighting adjusted   fall prevention program maintained   clutter free environment maintained   assistive device/personal items within reach   activity supervised  Taken 7/20/2023 1000 by Ashley Cartagena RN  Safety Promotion/Fall Prevention:   toileting scheduled   safety round/check completed   room organization consistent   nonskid shoes/slippers when out of bed   lighting adjusted   fall prevention  program maintained   clutter free environment maintained   assistive device/personal items within reach   activity supervised  Taken 7/20/2023 0755 by Ashley Cartagena RN  Safety Promotion/Fall Prevention:   toileting scheduled   safety round/check completed   room organization consistent   nonskid shoes/slippers when out of bed   lighting adjusted   fall prevention program maintained   clutter free environment maintained   assistive device/personal items within reach   activity supervised   Goal Outcome Evaluation:

## 2023-07-20 NOTE — SIGNIFICANT NOTE
78-year-old female admitted for dizziness, falls, and EtOH use.  Notified by Dr. Gasca of findings on neuroimaging.  MRA neck and CTA H/N reviewed.  Small area of ulcerated plaque noted in the left ICA.  No flow-limiting stenosis or occlusion noted. No evidence of dissection on CTA neck. MRI brain reviewed with no evidence of acute ischemia.    -Recommend continuing home Xarelto, adding aspirin 81 mg and atorvastatin 80 mg to medical regimen for TIA/stroke prevention as well as a heart healthy diet.  -Fall precautions  -EtOH cessation counseling for TIA/stroke prevention.    -Patient can follow-up in the stroke clinic in 3 months.  Please call with any further questions or concerns.    DANNIELLE Celestin, AGACNP-BC

## 2023-07-20 NOTE — DISCHARGE SUMMARY
[facility] Medicine Services  DISCHARGE SUMMARY    Patient Name: Catrachita Brunson  : 1945  MRN: [id number]    Date of Admission: 2023 10:58 AM  Date of Discharge:  23  Primary Care Physician: Maricel Cartagena DO    Consults       No orders found from 2023 to 2023.            Hospital Course     Presenting Problem: dizziness, HTN    Active Hospital Problems    Diagnosis  POA    **Hypertensive urgency [I16.0]  Yes    Dizziness [R42]  Unknown    Falls [W19.XXXA]  Unknown    Alcohol abuse [F10.10]  Unknown    Elevated troponin [R77.8]  Unknown    Paroxysmal atrial fibrillation [I48.0]  Yes    Systolic heart failure [I50.20]  Yes    Bradycardia, sinus [R00.1]  Yes    Primary hypertension [I10]  Yes      Resolved Hospital Problems   No resolved problems to display.          Hospital Course:  Catrachita Brunson is a 78 y.o. female pmh significant for HTN, PAF on xarelto, alcohol abuse presents from PCP office with resistant HTN, dizziness, headaches and falls. Patient reports taking home metoprolol and losartan daily but continues to have high BP and trouble with gait due to dizziness. She has sustained a few non injury falls     Dizziness  Hypertensive emergency w/ hx HTN  -initial bp 200's/100's  -admits not always compliant w/ medications  -tsh ok  -u/a ok  -mri brain negative cva  -given amlodipine 5mg, home losartan, metoprolol. Sbp 110's. I strongly suspect medical noncompliance (as bp dramatically dropped after restarting home meds)  -schedule meclizine  -stop amlodipine; continue home losartan, decrease metoprolol (sinus bradycardia).   -pt/ot both recommend home w/ home health per 23 evals     Left ICA ulcerated atherosclerotic plaque  Abnormal appearing area of left ica on mra neck  prompted cta neck. Cta neck revealed small area ulcerated atherosclerotic plaque  -continue asa 81 and statin; neurology reviewed, stroke clinic follow up 3 months   -pt's pharmacy  notified after dc to increase statin from 40 to 80mg nightly.  Pt has not picked up prescriptions yet     ETOH Abuse  -drinks 4-5 beer most nights; last drink 7/17/23 evening (night prior to admission)  -thiamine, folic acid replacement     Parox afib  Sinus bradycardia  -previous echo 1/27/23: EF >70%, diastolic dysfunction noted, mild ai  -heart rate high 40's, sinus  -continue amiodarone 100mg daily, decrease metoprolol to 12.5mg bid w/ hold parameters; monitor bp and heart rate  -continue xarelto  -follows w/ Dr. Nichols outpatient (next appt 1/26/24)    Discharge Follow Up Recommendations for outpatient labs/diagnostics:  PCP 1 week  Stroke clinic 3 months    Day of Discharge     HPI:   No new issues overnight  NO complaints, wants to go home    Review of Systems  Gen- No fevers, chills  CV- No chest pain, palpitations  Resp- No cough, dyspnea  GI- No N/V/D, abd pain      Vital Signs:   Temp:  [98.3 °F (36.8 °C)-98.6 °F (37 °C)] 98.5 °F (36.9 °C)  Heart Rate:  [50-64] 50  Resp:  [16-17] 17  BP: (105-160)/(56-87) 128/70      Physical Exam:  Constitutional: No acute distress, awake, alert  HENT: NCAT, mucous membranes moist  Respiratory: Clear to auscultation bilaterally, respiratory effort normal   Cardiovascular: RRR, no murmurs, rubs, or gallops  Gastrointestinal: Positive bowel sounds, soft, nontender, nondistended  Musculoskeletal: No bilateral ankle edema  Psychiatric: Appropriate affect, cooperative, pleasant  Neurologic: Oriented x 3, ASHLEY, speech clear  Skin: No rashes noted      Pertinent  and/or Most Recent Results     LAB RESULTS:      Lab 07/20/23  0324 07/19/23  0350 07/18/23  1119   WBC 6.13 5.56 5.41   HEMOGLOBIN 11.0* 11.4* 12.5   HEMATOCRIT 35.2 36.1 40.4   PLATELETS 222 221 276   NEUTROS ABS  --   --  2.74   IMMATURE GRANS (ABS)  --   --  0.02   LYMPHS ABS  --   --  1.82   MONOS ABS  --   --  0.64   EOS ABS  --   --  0.17   MCV 89.8 90.0 91.8         Lab 07/20/23  0324 07/19/23  0356  07/18/23  1119   SODIUM 140 140 140   POTASSIUM 3.8 3.7 4.5   CHLORIDE 101 102 100   CO2 32.0* 31.0* 31.0*   ANION GAP 7.0 7.0 9.0   BUN 12 12 11   CREATININE 0.84 0.79 0.81   EGFR 70.8 76.2 73.9   GLUCOSE 89 90 94   CALCIUM 8.6 8.7 9.4   MAGNESIUM 2.0 2.1  --    TSH  --  2.030  --          Lab 07/18/23  1119   TOTAL PROTEIN 7.0   ALBUMIN 4.6   GLOBULIN 2.4   ALT (SGPT) 11   AST (SGOT) 21   BILIRUBIN 0.5   ALK PHOS 60         Lab 07/18/23 2237 07/18/23 2038 07/18/23  1119   PROBNP  --   --  800.6   HSTROP T 9 12* 13*           Brief Urine Lab Results  (Last result in the past 365 days)        Color   Clarity   Blood   Leuk Est   Nitrite   Protein   CREAT   Urine HCG        07/18/23 1145 Yellow   Clear   Small (1+)   Small (1+)   Negative   Negative                 Microbiology Results (last 10 days)       ** No results found for the last 240 hours. **            CT Angiogram Neck    Result Date: 7/19/2023  CT ANGIOGRAM NECK Date of Exam: 7/19/2023 12:23 PM EDT Indication: Syncope/presyncope, cerebrovascular cause suspected MRA Left ICA irregularity with rec follow up. Comparison: MR angiogram 1 day prior. Technique: CTA of the neck was performed before and after the uneventful intravenous administration of 100 mL Isovue-370. Reconstructed coronal and sagittal images were also obtained. In addition, a 3-D volume rendered image was created for interpretation. Automated exposure control and iterative reconstruction methods were used. Findings: The lung apices are clear. Evaluation of the neck soft tissues demonstrates no pathologic adenopathy or aerodigestive tract mass. The osseous structures demonstrate no evidence of acute fracture or aggressive osseous lesion, with advanced multilevel spondylosis present and operative changes from prior C5-6 anterior fusion. Patent aortic arch with common origin of the brachiocephalic and left common carotid arteries noted. On the right, there is mild, less than 50%  atherosclerotic narrowing, similar to MR angiogram. The vertebral arteries are normal in course and caliber. Corresponding to the irregularity seen along the posterior wall of the left proximal common carotid artery, there is irregular calcific atherosclerotic plaque present, with a tiny outpouching noted likely reflecting small area of ulcerated soft plaque. There is no definite dissection or carotid web. Incidental intracranial imaging demonstrates no notable stenosis, occlusion or aneurysm, with infundibular origin of the right posterior communicating artery noted.     Impression: Recently noted irregularity along the posterior wall of the proximal left internal carotid artery is characterized on CTA as an irregular area of mixed calcific and soft atherosclerotic plaque, with probable small focal ulceration. There is no evidence of dissection or carotid web. Electronically Signed: Bakari Phan  7/19/2023 2:14 PM EDT  Workstation ID: [id number]    MRI Angiogram Head Without Contrast    Result Date: 7/18/2023  MRI ANGIOGRAM HEAD WO CONTRAST, MRI BRAIN WO CONTRAST, MRI ANGIOGRAM NECK W CONTRAST Date of Exam: 7/18/2023 3:11 PM EDT Indication: dizziness, headache.  Comparison: None available. Technique: Multiplanar multisequence MRI of the brain performed without IV contrast. Noncontrast time-of-flight 3-dimensional MR angiogram of the head with three-dimensional maximum intensity projections postprocessed. IV contrast enhanced MR angiogram of the neck was also performed. Findings: MRI brain: No acute infarct is present on diffusion weighted sequences. Midline structures are normal and the craniocervical junction appears satisfactory. Age-related changes are present, with mild volume loss and fairly prominent periventricular leukomalacia, likely reflecting chronic small vessel ischemic change. There is otherwise no evidence of intracranial hemorrhage, mass or mass effect. The ventricles are normal in size and  configuration. The orbits are normal. The paranasal sinuses are grossly clear. MR angiogram head: The carotid siphons demonstrate no significant atherosclerotic narrowing. The anterior cerebral arteries are normal in course and caliber bilaterally. The right middle cerebral artery demonstrates no evidence of flow-limiting stenosis,  large vessel occlusion or aneurysm. The left middle cerebral artery is similarly normal in course and caliber. The vertebrobasilar system is patent. The posterior cerebral arteries are normal in course and caliber bilaterally. MR angiogram neck: The aortic arch demonstrates a typical three-vessel branching. On the right, there is no significant atherosclerotic narrowing of the ICA origin, 0% by NASCET criteria. On the left, there is no evidence of high-grade atherosclerotic narrowing, 0% by NASCET criteria, however there is questionable linear luminal irregularity, with the appearance of possible carotid web or short segment dissection. The remaining left ICA is normal. The vertebral arteries are normal in course and caliber bilaterally.     Impression: MRI of the brain demonstrates no evidence of acute infarct. Relatively advanced chronic changes are present, including prominent bilateral periventricular leukomalacia, most consistent with chronic small vessel ischemic change. There is otherwise no evidence of hemorrhage, mass or mass effect. MR angiogram of the neck demonstrates a focal luminal irregularity, appearing somewhat linear involving the posterior wall of the left ICA origin. There is no evidence of associated luminal narrowing, however finding raises concern for either a carotid web or short segment dissection. CT angiogram could be useful to further evaluate. MR angiogram of the head and neck is otherwise essentially normal, without evidence of flow-limiting stenosis, large vessel occlusion or aneurysm. Electronically Signed: Bakari Phan  7/18/2023 4:23 PM EDT  Workstation  ID: [id number]    MRI Angiogram Neck With Contrast    Result Date: 7/18/2023  MRI ANGIOGRAM HEAD WO CONTRAST, MRI BRAIN WO CONTRAST, MRI ANGIOGRAM NECK W CONTRAST Date of Exam: 7/18/2023 3:11 PM EDT Indication: dizziness, headache.  Comparison: None available. Technique: Multiplanar multisequence MRI of the brain performed without IV contrast. Noncontrast time-of-flight 3-dimensional MR angiogram of the head with three-dimensional maximum intensity projections postprocessed. IV contrast enhanced MR angiogram of the neck was also performed. Findings: MRI brain: No acute infarct is present on diffusion weighted sequences. Midline structures are normal and the craniocervical junction appears satisfactory. Age-related changes are present, with mild volume loss and fairly prominent periventricular leukomalacia, likely reflecting chronic small vessel ischemic change. There is otherwise no evidence of intracranial hemorrhage, mass or mass effect. The ventricles are normal in size and configuration. The orbits are normal. The paranasal sinuses are grossly clear. MR angiogram head: The carotid siphons demonstrate no significant atherosclerotic narrowing. The anterior cerebral arteries are normal in course and caliber bilaterally. The right middle cerebral artery demonstrates no evidence of flow-limiting stenosis,  large vessel occlusion or aneurysm. The left middle cerebral artery is similarly normal in course and caliber. The vertebrobasilar system is patent. The posterior cerebral arteries are normal in course and caliber bilaterally. MR angiogram neck: The aortic arch demonstrates a typical three-vessel branching. On the right, there is no significant atherosclerotic narrowing of the ICA origin, 0% by NASCET criteria. On the left, there is no evidence of high-grade atherosclerotic narrowing, 0% by NASCET criteria, however there is questionable linear luminal irregularity, with the appearance of possible carotid web or short  segment dissection. The remaining left ICA is normal. The vertebral arteries are normal in course and caliber bilaterally.     Impression: MRI of the brain demonstrates no evidence of acute infarct. Relatively advanced chronic changes are present, including prominent bilateral periventricular leukomalacia, most consistent with chronic small vessel ischemic change. There is otherwise no evidence of hemorrhage, mass or mass effect. MR angiogram of the neck demonstrates a focal luminal irregularity, appearing somewhat linear involving the posterior wall of the left ICA origin. There is no evidence of associated luminal narrowing, however finding raises concern for either a carotid web or short segment dissection. CT angiogram could be useful to further evaluate. MR angiogram of the head and neck is otherwise essentially normal, without evidence of flow-limiting stenosis, large vessel occlusion or aneurysm. Electronically Signed: Bakari Phan  7/18/2023 4:23 PM EDT  Workstation ID: [id number]    MRI Brain Without Contrast    Result Date: 7/18/2023  MRI ANGIOGRAM HEAD WO CONTRAST, MRI BRAIN WO CONTRAST, MRI ANGIOGRAM NECK W CONTRAST Date of Exam: 7/18/2023 3:11 PM EDT Indication: dizziness, headache.  Comparison: None available. Technique: Multiplanar multisequence MRI of the brain performed without IV contrast. Noncontrast time-of-flight 3-dimensional MR angiogram of the head with three-dimensional maximum intensity projections postprocessed. IV contrast enhanced MR angiogram of the neck was also performed. Findings: MRI brain: No acute infarct is present on diffusion weighted sequences. Midline structures are normal and the craniocervical junction appears satisfactory. Age-related changes are present, with mild volume loss and fairly prominent periventricular leukomalacia, likely reflecting chronic small vessel ischemic change. There is otherwise no evidence of intracranial hemorrhage, mass or mass effect. The  ventricles are normal in size and configuration. The orbits are normal. The paranasal sinuses are grossly clear. MR angiogram head: The carotid siphons demonstrate no significant atherosclerotic narrowing. The anterior cerebral arteries are normal in course and caliber bilaterally. The right middle cerebral artery demonstrates no evidence of flow-limiting stenosis,  large vessel occlusion or aneurysm. The left middle cerebral artery is similarly normal in course and caliber. The vertebrobasilar system is patent. The posterior cerebral arteries are normal in course and caliber bilaterally. MR angiogram neck: The aortic arch demonstrates a typical three-vessel branching. On the right, there is no significant atherosclerotic narrowing of the ICA origin, 0% by NASCET criteria. On the left, there is no evidence of high-grade atherosclerotic narrowing, 0% by NASCET criteria, however there is questionable linear luminal irregularity, with the appearance of possible carotid web or short segment dissection. The remaining left ICA is normal. The vertebral arteries are normal in course and caliber bilaterally.     Impression: MRI of the brain demonstrates no evidence of acute infarct. Relatively advanced chronic changes are present, including prominent bilateral periventricular leukomalacia, most consistent with chronic small vessel ischemic change. There is otherwise no evidence of hemorrhage, mass or mass effect. MR angiogram of the neck demonstrates a focal luminal irregularity, appearing somewhat linear involving the posterior wall of the left ICA origin. There is no evidence of associated luminal narrowing, however finding raises concern for either a carotid web or short segment dissection. CT angiogram could be useful to further evaluate. MR angiogram of the head and neck is otherwise essentially normal, without evidence of flow-limiting stenosis, large vessel occlusion or aneurysm. Electronically Signed: Bakari Phan   7/18/2023 4:23 PM EDT  Workstation ID: [id number]    XR Chest 1 View    Result Date: 7/18/2023  XR CHEST 1 VW Date of Exam: 7/18/2023 11:25 AM EDT Indication: uncontrolled HTN, generalized weakness Comparison: None available. Findings: There is mild cardiomegaly. There is pulmonary vascular congestion. Lung fields are well inflated. There is no focal consolidating infiltrate and there is no effusion.     Impression: Findings suggest mild CHF. Follow-up exam may be useful. Electronically Signed: Puja Coronado MD  7/18/2023 11:37 AM EDT  Workstation ID: [id number]         Results for orders placed during the hospital encounter of 01/27/23    Adult Transthoracic Echo Complete w/ Color, Spectral and Contrast if necessary per protocol    Interpretation Summary    Left ventricular systolic function is hyperdynamic (EF > 70%).    Left ventricular wall thickness is consistent with hypertrophy. Sigmoid-shaped ventricular septum is present.    Left ventricular diastolic function is consistent with (grade II w/high LAP) pseudonormalization.    Left atrial volume is moderately increased.    The aortic valve is abnormal in structure. There is mild calcification of the aortic valve. The aortic valve appears trileaflet. Mild aortic valve regurgitation is present. No hemodynamically significant aortic valve stenosis is present.    Estimated right ventricular systolic pressure from tricuspid regurgitation is normal (<35 mmHg).    Mild dilation of the ascending aorta is present.    The right atrial cavity is dilated.    The right atrial cavity is dilated. The inferior vena cava is normally sized. The diameter of the inferior vena cava is 1.3 cm. Normal IVC inspiratory collapse of greater than 50% noted.      Discharge Details        Discharge Medications        New Medications        Instructions Start Date   aspirin 81 MG chewable tablet   81 mg, Oral, Daily   Start Date: July 21, 2023     atorvastatin 40 MG tablet  Commonly known as:  LIPITOR   80 mg, Oral, Nightly      folic acid 1 MG tablet  Commonly known as: FOLVITE   1 mg, Oral, Daily   Start Date: July 21, 2023     meclizine 25 MG tablet  Commonly known as: ANTIVERT   25 mg, Oral, Every 8 Hours Scheduled      multivitamin with minerals tablet tablet   1 tablet, Oral, Daily   Start Date: July 21, 2023     thiamine 100 MG tablet  Commonly known as: VITAMIN B1   100 mg, Oral, Daily   Start Date: July 24, 2023            Changes to Medications        Instructions Start Date   Diclofenac Sodium 1 % gel gel  Commonly known as: VOLTAREN  What changed:   how much to take  how to take this  when to take this  reasons to take this  additional instructions   Apply 4 grams topically to right knee QID prn pain      metoprolol tartrate 25 MG tablet  Commonly known as: LOPRESSOR  What changed:   medication strength  how much to take   12.5 mg, Oral, 2 Times Daily             Continue These Medications        Instructions Start Date   amiodarone 200 MG tablet  Commonly known as: PACERONE   100 mg, Oral, Daily      colchicine 0.6 MG tablet   0.6 mg, Oral, Daily      Loratadine 10 MG capsule   10 mg, Oral, Daily      losartan 100 MG tablet  Commonly known as: COZAAR   100 mg, Oral, Daily      rivaroxaban 20 MG tablet  Commonly known as: XARELTO   20 mg, Oral, Daily With Dinner      traZODone 100 MG tablet  Commonly known as: DESYREL   100 mg, Oral, Nightly      vitamin B-12 1000 MCG tablet  Commonly known as: CYANOCOBALAMIN   1,000 mcg, Oral, Daily             Stop These Medications      hydroCHLOROthiazide 25 MG tablet  Commonly known as: HYDRODIURIL              Allergies   Allergen Reactions    Penicillins Anaphylaxis         Discharge Disposition:  Home or Self Care    Diet:  Hospital:  Diet Order   Procedures    Diet: Regular/House Diet; Texture: Regular Texture (IDDSI 7); Fluid Consistency: Thin (IDDSI 0)       Activity:  Activity Instructions       Activity as Tolerated              CODE STATUS:     Code Status and Medical Interventions:   Ordered at: 07/18/23 1735     Medical Intervention Limits:    NO intubation (DNI)    NO cardioversion     Code Status (Patient has no pulse and is not breathing):    No CPR (Do Not Attempt to Resuscitate)     Medical Interventions (Patient has pulse or is breathing):    Limited Support     Release to patient:    Routine Release       Future Appointments   Date Time Provider Department Center   7/27/2023 11:45 AM Maricel Cartagena DO [facility]   11/3/2023  2:00 PM Maricel Cartagena DO [facility]   1/26/2024  1:15 PM Jakob Nichols MD [facility]       Additional Instructions for the Follow-ups that You Need to Schedule       Discharge Follow-up with PCP   As directed       Currently Documented PCP:    Maricel Cartagena DO    PCP Phone Number:    [phone]     Follow Up Details: 1 week         Discharge Follow-up with Specified Provider: stroke clinic; 3 Months   As directed      To: stroke clinic    Follow Up: 3 Months                   DANNIELLE Morejon  07/20/23      Time Spent on Discharge:  I spent  25  minutes on this discharge activity which included: face-to-face encounter with the patient, reviewing the data in the system, coordination of the care with the nursing staff as well as consultants, documentation, and entering orders.

## 2023-07-27 ENCOUNTER — OFFICE VISIT (OUTPATIENT)
Dept: FAMILY MEDICINE CLINIC | Facility: CLINIC | Age: 78
End: 2023-07-27
Payer: MEDICARE

## 2023-07-27 VITALS
BODY MASS INDEX: 27.09 KG/M2 | DIASTOLIC BLOOD PRESSURE: 70 MMHG | HEART RATE: 58 BPM | SYSTOLIC BLOOD PRESSURE: 122 MMHG | WEIGHT: 138 LBS | TEMPERATURE: 97.3 F | HEIGHT: 60 IN

## 2023-07-27 DIAGNOSIS — Z09 HOSPITAL DISCHARGE FOLLOW-UP: Primary | ICD-10-CM

## 2023-07-27 RX ORDER — ATORVASTATIN CALCIUM 80 MG/1
80 TABLET, FILM COATED ORAL
COMMUNITY
Start: 2023-07-20

## 2023-07-27 NOTE — PROGRESS NOTES
"Chief Complaint  Hypertension and Medication Reaction (Making her sick and sweat really bad /)    History of Present Illness    The patient was discharged on 7/20 after being treated for htn urgency.   given amlodipine 5mg, home losartan, metoprolol and bp improved.   Metoprolol was decreased given bradycardia.  Amlodipine was stopped.   At home her bp has been 82/51, 133/73, 111/62, 126/81.     She has been taking full dose metoprolol at 50 mg twice daily.     She denies any sob cp palps. She says when he bp drops she is dizzy.       The following portions of the patient's history were reviewed and updated as appropriate: allergies, current medications, past family history, past medical history, past social history, past surgical history, and problem list.    OBJECTIVE:  /70   Pulse 58   Temp 97.3 °F (36.3 °C)   Ht 152.4 cm (60\")   Wt 62.6 kg (138 lb)   BMI 26.95 kg/m²       Physical Exam  Constitutional:       General: She is not in acute distress.     Appearance: Normal appearance.   HENT:      Head: Normocephalic and atraumatic.   Eyes:      Extraocular Movements: Extraocular movements intact.   Cardiovascular:      Rate and Rhythm: Normal rate and regular rhythm.      Heart sounds: No murmur heard.  Pulmonary:      Effort: Pulmonary effort is normal. No respiratory distress.      Breath sounds: Normal breath sounds. No stridor. No wheezing, rhonchi or rales.   Skin:     Findings: No rash.   Neurological:      General: No focal deficit present.      Mental Status: She is alert.   Psychiatric:         Mood and Affect: Mood normal.                  Assessment and Plan   Diagnoses and all orders for this visit:    1. Hospital discharge follow-up (Primary)    Other orders  -     metoprolol tartrate (LOPRESSOR) 25 MG tablet; Take 0.5 tablets by mouth 2 (Two) Times a Day.      Reviewed her dc summary.     Given abnormal left ca she was referred to neurology. This is scheduled.     Advised her to stop metop " 50 mg bid and start the new prescription metop 12.5 bid.   She has been taking hctz, advised her to stop.  Continue losartan.   She was advised to check bp at home and let me know for bp reading less than 105 systolic and to check for any dizziness.     For alcohol use I have recommended counseling and referral for naltrexone. She says she is no longer drinking and declines these.     Return in about 4 days (around 7/31/2023).       Maricel Cartagena D.O.  [facility] [place]

## 2023-08-01 PROBLEM — F10.90 ALCOHOL USE: Status: ACTIVE | Noted: 2023-08-01

## 2023-08-01 PROBLEM — Z78.9 ALCOHOL USE: Status: ACTIVE | Noted: 2023-08-01

## 2023-10-02 ENCOUNTER — TELEPHONE (OUTPATIENT)
Dept: NEUROLOGY | Facility: CLINIC | Age: 78
End: 2023-10-02
Payer: MEDICARE

## 2023-10-02 NOTE — TELEPHONE ENCOUNTER
LVM asking patient to please do not eat or drink before her appointment tomorrow so we can get some fasting blood work.

## 2023-10-05 RX ORDER — LANOLIN ALCOHOL/MO/W.PET/CERES
1000 CREAM (GRAM) TOPICAL DAILY
Qty: 90 TABLET | Refills: 3 | Status: SHIPPED | OUTPATIENT
Start: 2023-10-05

## 2023-10-05 RX ORDER — AMIODARONE HYDROCHLORIDE 200 MG/1
100 TABLET ORAL DAILY
Qty: 90 TABLET | Refills: 1 | Status: SHIPPED | OUTPATIENT
Start: 2023-10-05

## 2023-10-05 RX ORDER — LOSARTAN POTASSIUM 100 MG/1
100 TABLET ORAL DAILY
Qty: 90 TABLET | Refills: 1 | Status: SHIPPED | OUTPATIENT
Start: 2023-10-05

## 2023-10-05 RX ORDER — COLCHICINE 0.6 MG/1
0.6 TABLET ORAL DAILY
Qty: 30 TABLET | Refills: 11 | Status: SHIPPED | OUTPATIENT
Start: 2023-10-05 | End: 2024-10-04

## 2023-10-05 NOTE — TELEPHONE ENCOUNTER
Caller: Catrachita Brunson    Relationship: Self    Best call back number: [phone]     Requested Prescriptions:   Requested Prescriptions     Pending Prescriptions Disp Refills    rivaroxaban (XARELTO) 20 MG tablet 30 tablet      Sig: Take 1 tablet by mouth Daily With Dinner.    colchicine 0.6 MG tablet 30 tablet 11     Sig: Take 1 tablet by mouth Daily.    losartan (COZAAR) 100 MG tablet 90 tablet 1     Sig: Take 1 tablet by mouth Daily.    vitamin B-12 (CYANOCOBALAMIN) 1000 MCG tablet 90 tablet 3     Sig: Take 1 tablet by mouth Daily.    amiodarone (PACERONE) 200 MG tablet 90 tablet 1     Sig: Take 0.5 tablets by mouth Daily.        Pharmacy where request should be sent: [facility]Y #[id number] - [address] - [phone]  - [phone] FX     Last office visit with prescribing clinician: 7/27/2023   Last telemedicine visit with prescribing clinician: Visit date not found   Next office visit with prescribing clinician: 10/11/2023     Additional details provided by patient: SHE ALSO NEEDS HER INHALER FOR ASTHMA. IT IS NOT ON HER CURRENT LIST    Does the patient have less than a 3 day supply:  [x] Yes  [] No    Would you like a call back once the refill request has been completed: [] Yes [x] No    If the office needs to give you a call back, can they leave a voicemail: [] Yes [x] No    Yves Mullen, PCT   10/05/23 10:34 EDT

## 2023-10-11 ENCOUNTER — OFFICE VISIT (OUTPATIENT)
Dept: FAMILY MEDICINE CLINIC | Facility: CLINIC | Age: 78
End: 2023-10-11
Payer: MEDICARE

## 2023-10-11 VITALS
SYSTOLIC BLOOD PRESSURE: 172 MMHG | BODY MASS INDEX: 28.9 KG/M2 | RESPIRATION RATE: 15 BRPM | OXYGEN SATURATION: 98 % | TEMPERATURE: 98.3 F | WEIGHT: 148 LBS | DIASTOLIC BLOOD PRESSURE: 90 MMHG | HEART RATE: 60 BPM

## 2023-10-11 DIAGNOSIS — S09.90XS INJURY OF HEAD, SEQUELA: ICD-10-CM

## 2023-10-11 DIAGNOSIS — Z79.899 ON AMIODARONE THERAPY: ICD-10-CM

## 2023-10-11 DIAGNOSIS — R05.9 COUGH, UNSPECIFIED TYPE: Primary | ICD-10-CM
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
## 2023-10-11 RX ORDER — ALBUTEROL SULFATE 90 UG/1
2 AEROSOL, METERED RESPIRATORY (INHALATION) EVERY 4 HOURS PRN
Qty: 1 G | Refills: 0 | Status: SHIPPED | OUTPATIENT
Start: 2023-10-11

## 2023-10-11 RX ORDER — ATORVASTATIN CALCIUM 80 MG/1
80 TABLET, FILM COATED ORAL
Qty: 90 TABLET | Refills: 1 | Status: SHIPPED | OUTPATIENT
Start: 2023-10-11

## 2023-10-11 NOTE — PROGRESS NOTES
Please call to let the patient know that her back has signs of scoliosis as well as severe narrowing between the vertebrae in her back without fracture. If she has any low back pain please schedule a visit to discuss.

## 2023-10-11 NOTE — PROGRESS NOTES
Chief Complaint  Fall and Headache (Has had ha since falling.  Having tailbone pain also)    Fall  Associated symptoms include headaches.   Headache      Headache  She fell one month ago on water near pool and hit the back of her head she injured her tailbone and her head. She says her tailbone is better but her head is hurting . No pain elsewhere in her spine.   No numbness tingling or burning. No new vision changes or weakness. No other complaints.       A fib  She says she takes the amiodarone and the blood thinner.     Htn  Did not take medication today      She also states she was diagnosed with asthma and copd and would like inhaler called in .     The following portions of the patient's history were reviewed and updated as appropriate: allergies, current medications, past family history, past medical history, past social history, past surgical history, and problem list.    OBJECTIVE:  /90   Pulse 60   Temp 98.3 øF (36.8 øC)   Resp 15   Wt 67.1 kg (148 lb)   SpO2 98%   BMI 28.90 kg/mý       Physical Exam  Constitutional:       General: She is not in acute distress.     Appearance: Normal appearance.   HENT:      Head: Normocephalic and atraumatic.   Eyes:      Extraocular Movements: Extraocular movements intact.   Cardiovascular:      Rate and Rhythm: Normal rate and regular rhythm.      Heart sounds: No murmur heard.  Pulmonary:      Effort: Pulmonary effort is normal. No respiratory distress.      Breath sounds: Normal breath sounds. No stridor. No wheezing, rhonchi or rales.   Musculoskeletal:      Comments: Normal gait  Decreased neck rom when side turning left to right  No pain on palpation of spine.   Cranial nerves intact  Upper and lower extremity muscle strength and sensations are intact.    Skin:     Findings: No rash.   Neurological:      General: No focal deficit present.      Mental Status: She is alert and oriented to person, place, and time.      Cranial Nerves: No cranial nerve  deficit.   Psychiatric:         Mood and Affect: Mood normal.                  Assessment and Plan   Diagnoses and all orders for this visit:    2. On amiodarone therapy  -     Spirometry; Future  -     XR Chest PA & Lateral (In Office)    3. Injury of head, sequela  -     CT Head Without Contrast; Future    4. Fall, sequela  -     XR Sacrum & Coccyx (In Office)    Other orders  -     atorvastatin (LIPITOR) 80 MG tablet; Take 1 tablet by mouth every night at bedtime.  Dispense: 90 tablet; Refill: 1  -     metoprolol tartrate (LOPRESSOR) 25 MG tablet; Take 0.5 tablets by mouth 2 (Two) Times a Day.  -     rivaroxaban (XARELTO) 20 MG tablet; Take 1 tablet by mouth Daily With Dinner.  Dispense: 90 tablet; Refill: 1        Headache since fall  In high risk elderly pt on anticoag   Check ct head stat  Return to care for any new symptoms  Start tylenol otc for pain      A fib  Continue current meds.   Check pfts and xray, discussed this medication can cause lung disease so we need to monitor. She notes being diagnosed with copd and asthma in the past.   Tsh on file.       Htn  She did not take her medication today.     Return in about 3 months (around 1/11/2024), or if symptoms worsen or fail to improve.       Maricel Cartagena D.O.  [facility]

## 2023-10-30 ENCOUNTER — HOSPITAL ENCOUNTER (OUTPATIENT)
Dept: PULMONOLOGY | Facility: HOSPITAL | Age: 78
Discharge: HOME | End: 2023-10-30
Admitting: STUDENT IN AN ORGANIZED HEALTH CARE EDUCATION/TRAINING PROGRAM
Payer: MEDICARE

## 2023-10-30 DIAGNOSIS — Z79.899 ON AMIODARONE THERAPY: ICD-10-CM

## 2023-10-30 PROCEDURE — 94664 DEMO&/EVAL PT USE INHALER: CPT

## 2023-10-30 PROCEDURE — 94060 EVALUATION OF WHEEZING: CPT

## 2023-10-30 PROCEDURE — 94727 GAS DIL/WSHOT DETER LNG VOL: CPT

## 2023-10-30 PROCEDURE — 94640 AIRWAY INHALATION TREATMENT: CPT

## 2023-10-30 PROCEDURE — 94729 DIFFUSING CAPACITY: CPT

## 2023-10-30 RX ORDER — ALBUTEROL SULFATE 2.5 MG/3ML
2.5 SOLUTION RESPIRATORY (INHALATION) ONCE
Status: COMPLETED | OUTPATIENT
Start: 2023-10-30 | End: 2023-10-30

## 2023-10-30 RX ADMIN — ALBUTEROL SULFATE 2.5 MG: 2.5 SOLUTION RESPIRATORY (INHALATION) at 12:46

## 2023-11-03 ENCOUNTER — TELEPHONE (OUTPATIENT)
Dept: FAMILY MEDICINE CLINIC | Facility: CLINIC | Age: 78
End: 2023-11-03

## 2023-11-03 RX ORDER — ALBUTEROL SULFATE 90 UG/1
2 AEROSOL, METERED RESPIRATORY (INHALATION) EVERY 4 HOURS PRN
Qty: 1 G | Refills: 1 | Status: SHIPPED | OUTPATIENT
Start: 2023-11-03

## 2023-11-06 RX ORDER — FLUTICASONE PROPIONATE 110 UG/1
1 AEROSOL, METERED RESPIRATORY (INHALATION)
Qty: 1 EACH | Refills: 11 | Status: SHIPPED | OUTPATIENT
Start: 2023-11-06

## 2023-11-15 ENCOUNTER — TELEPHONE (OUTPATIENT)
Dept: FAMILY MEDICINE CLINIC | Facility: CLINIC | Age: 78
End: 2023-11-15

## 2023-11-15 NOTE — TELEPHONE ENCOUNTER
Please let her know she does have hypertension which means high blood pressure. We have her on medication for this.  If she has any other questions let me know.

## 2023-11-15 NOTE — TELEPHONE ENCOUNTER
HUB TO READ    LVM    Per -       Please let her know she does have hypertension which means high blood pressure. We have her on medication for this.  If she has any other questions let me know.

## 2023-11-15 NOTE — TELEPHONE ENCOUNTER
Caller: Catrachita Brunson    Relationship: Self    Best call back number: [phone]     What is the best time to reach you: ANYTIME     Who are you requesting to speak with (clinical staff, provider,  specific staff member): CLINICAL STAFF     Do you know the name of the person who called: THE PATIENT CATRACHITA    What was the call regarding: THE PATIENT CATRACHITA IS REQUESTING A CALL BACK TO KNOW IF SHE HAS HYPERTENSION AND WHAT HYPERTENSION IS.     Is it okay if the provider responds through MyChart: NO, PLEASE CALL AND ADVISE

## 2024-04-04 RX ORDER — LOSARTAN POTASSIUM 100 MG/1
100 TABLET ORAL DAILY
Qty: 90 TABLET | Refills: 1 | Status: SHIPPED | OUTPATIENT
Start: 2024-04-04

## 2024-04-04 RX ORDER — ATORVASTATIN CALCIUM 80 MG/1
80 TABLET, FILM COATED ORAL
Qty: 90 TABLET | Refills: 1 | Status: SHIPPED | OUTPATIENT
Start: 2024-04-04

## 2024-08-16 RX ORDER — RIVAROXABAN 20 MG/1
20 TABLET, FILM COATED ORAL
Qty: 90 TABLET | Refills: 1 | Status: SHIPPED | OUTPATIENT
Start: 2024-08-16

## 2024-09-16 RX ORDER — LOSARTAN POTASSIUM 100 MG/1
100 TABLET ORAL DAILY
Qty: 90 TABLET | Refills: 1 | Status: SHIPPED | OUTPATIENT
Start: 2024-09-16

## 2024-09-16 RX ORDER — ATORVASTATIN CALCIUM 80 MG/1
80 TABLET, FILM COATED ORAL
Qty: 90 TABLET | Refills: 1 | Status: SHIPPED | OUTPATIENT
Start: 2024-09-16

## 2024-11-25 RX ORDER — AMIODARONE HYDROCHLORIDE 200 MG/1
100 TABLET ORAL DAILY
Qty: 90 TABLET | Refills: 0 | Status: SHIPPED | OUTPATIENT
Start: 2024-11-25

## 2024-11-25 NOTE — TELEPHONE ENCOUNTER
Do you mind to help the patient schedule with cardiology to discuss long term refill of amiodarone as he wants her to wean it eventually?

## 2025-05-03 ENCOUNTER — READMISSION MANAGEMENT (OUTPATIENT)
Dept: CALL CENTER | Facility: HOSPITAL | Age: 80
End: 2025-05-03
Payer: MEDICARE

## 2025-05-03 NOTE — OUTREACH NOTE
Prep Survey      Flowsheet Row Responses   [facility] patient discharged from? Non-BH   Is LACE score < 7 ? Non-BH Discharge   Eligibility [facility]   Date of Admission 04/28/25   Date of Discharge 05/02/25   Discharge Disposition Home or Self Care   Discharge diagnosis Hypotension,   Does the patient have one of the following disease processes/diagnoses(primary or secondary)? Other   Prep survey completed? Yes            MARIN GLEZ - Registered Nurse

## 2025-05-05 ENCOUNTER — TRANSITIONAL CARE MANAGEMENT TELEPHONE ENCOUNTER (OUTPATIENT)
Dept: CALL CENTER | Facility: HOSPITAL | Age: 80
End: 2025-05-05
Payer: MEDICARE

## 2025-05-05 NOTE — OUTREACH NOTE
Call Center TCM Note      Flowsheet Row Responses   [facility] patient discharged from? Non-  [D/c'd from [facility]]   Does the patient have one of the following disease processes/diagnoses(primary or secondary)? Other   TCM attempt successful? Yes  [No one listed on VR]   Call start time 0955   Call end time 0958   Discharge diagnosis Hypotension,   Meds reviewed with patient/caregiver? Yes   Is the patient having any side effects they believe may be caused by any medication additions or changes? No   Does the patient have all medications ordered at discharge? Yes   Is the patient taking all medications as directed (includes completed medication regime)? Yes   Does the patient have an appointment with their PCP within 7-14 days of discharge? No   Nursing Interventions Routed TCM call to PCP office  [When trying to schedule TCM visit, [employer] received that  no longer accepts patient's insurance plan. Message routed to office and patient plans to call office directly.]   Has home health visited the patient within 72 hours of discharge? N/A   Psychosocial issues? No   Did the patient receive a copy of their discharge instructions? Yes   What is the patient's perception of their health status since discharge? Improving   Is the patient/caregiver able to teach back signs and symptoms related to disease process for when to call PCP? Yes   Is the patient/caregiver able to teach back signs and symptoms related to disease process for when to call 911? Yes   Is the patient/caregiver able to teach back the hierarchy of who to call/visit for symptoms/problems? PCP, Specialist, Home health nurse, Urgent Care, ED, 911 Yes   TCM call completed? Yes   Wrap up additional comments Patient reports she is doing well, denies concerns today.   Call end time 0958   Would this patient benefit from a Referral to [facility]? No   Is the patient interested in additional calls from an ambulatory ? No            Ester Lagos  Registered Nurse    5/5/2025, 10:03 EDT

## 2025-05-06 NOTE — PROGRESS NOTES
HUB TO READ    Attempted to call patient to discuss, but was  unable to leave a message. Please let patient know that as of 1/1/25, [employer] HMO is out of network with all [facility].

## 2025-06-13 RX ORDER — ALBUTEROL SULFATE 90 UG/1
2 INHALANT RESPIRATORY (INHALATION) EVERY 4 HOURS PRN
Qty: 1 G | Refills: 5 | Status: SHIPPED | OUTPATIENT
Start: 2025-06-13